# Patient Record
Sex: FEMALE | Race: ASIAN | NOT HISPANIC OR LATINO | Employment: UNEMPLOYED | URBAN - METROPOLITAN AREA
[De-identification: names, ages, dates, MRNs, and addresses within clinical notes are randomized per-mention and may not be internally consistent; named-entity substitution may affect disease eponyms.]

---

## 2019-02-08 ENCOUNTER — COMMUNICATION - HEALTHEAST (OUTPATIENT)
Dept: SCHEDULING | Facility: CLINIC | Age: 34
End: 2019-02-08

## 2019-02-08 ENCOUNTER — OFFICE VISIT - HEALTHEAST (OUTPATIENT)
Dept: FAMILY MEDICINE | Facility: CLINIC | Age: 34
End: 2019-02-08

## 2019-02-08 ENCOUNTER — RECORDS - HEALTHEAST (OUTPATIENT)
Dept: GENERAL RADIOLOGY | Facility: CLINIC | Age: 34
End: 2019-02-08

## 2019-02-08 DIAGNOSIS — R59.0 LYMPHADENOPATHY, AXILLARY: ICD-10-CM

## 2019-02-08 DIAGNOSIS — R07.89 OTHER CHEST PAIN: ICD-10-CM

## 2019-02-08 DIAGNOSIS — R07.89 ATYPICAL CHEST PAIN: ICD-10-CM

## 2019-02-08 LAB
ALBUMIN SERPL-MCNC: 4.1 G/DL (ref 3.5–5)
ALP SERPL-CCNC: 92 U/L (ref 45–120)
ALT SERPL W P-5'-P-CCNC: 16 U/L (ref 0–45)
ANION GAP SERPL CALCULATED.3IONS-SCNC: 10 MMOL/L (ref 5–18)
AST SERPL W P-5'-P-CCNC: 14 U/L (ref 0–40)
ATRIAL RATE - MUSE: 90 BPM
BASOPHILS # BLD AUTO: 0.1 THOU/UL (ref 0–0.2)
BASOPHILS NFR BLD AUTO: 0 % (ref 0–2)
BILIRUB SERPL-MCNC: 0.2 MG/DL (ref 0–1)
BUN SERPL-MCNC: 7 MG/DL (ref 8–22)
CALCIUM SERPL-MCNC: 9.7 MG/DL (ref 8.5–10.5)
CHLORIDE BLD-SCNC: 105 MMOL/L (ref 98–107)
CK SERPL-CCNC: 115 U/L (ref 30–190)
CK-MB: 1 NG/ML (ref 0–7)
CO2 SERPL-SCNC: 23 MMOL/L (ref 22–31)
CREAT SERPL-MCNC: 0.77 MG/DL (ref 0.6–1.1)
D DIMER PPP FEU-MCNC: 0.31 FEU UG/ML
DIASTOLIC BLOOD PRESSURE - MUSE: NORMAL MMHG
EOSINOPHIL # BLD AUTO: 0.1 THOU/UL (ref 0–0.4)
EOSINOPHIL NFR BLD AUTO: 1 % (ref 0–6)
ERYTHROCYTE [DISTWIDTH] IN BLOOD BY AUTOMATED COUNT: 12.4 % (ref 11–14.5)
GFR SERPL CREATININE-BSD FRML MDRD: >60 ML/MIN/1.73M2
GLUCOSE BLD-MCNC: 102 MG/DL (ref 70–125)
HCT VFR BLD AUTO: 39.4 % (ref 35–47)
HGB BLD-MCNC: 12.7 G/DL (ref 12–16)
INTERPRETATION ECG - MUSE: NORMAL
LYMPHOCYTES # BLD AUTO: 2.3 THOU/UL (ref 0.8–4.4)
LYMPHOCYTES NFR BLD AUTO: 20 % (ref 20–40)
MCH RBC QN AUTO: 28.7 PG (ref 27–34)
MCHC RBC AUTO-ENTMCNC: 32.3 G/DL (ref 32–36)
MCV RBC AUTO: 89 FL (ref 80–100)
MONOCYTES # BLD AUTO: 0.5 THOU/UL (ref 0–0.9)
MONOCYTES NFR BLD AUTO: 4 % (ref 2–10)
NEUTROPHILS # BLD AUTO: 8.9 THOU/UL (ref 2–7.7)
NEUTROPHILS NFR BLD AUTO: 75 % (ref 50–70)
P AXIS - MUSE: 56 DEGREES
PLATELET # BLD AUTO: 511 THOU/UL (ref 140–440)
PMV BLD AUTO: 6.7 FL (ref 7–10)
POTASSIUM BLD-SCNC: 4.5 MMOL/L (ref 3.5–5)
PR INTERVAL - MUSE: 130 MS
PROT SERPL-MCNC: 8.5 G/DL (ref 6–8)
QRS DURATION - MUSE: 80 MS
QT - MUSE: 352 MS
QTC - MUSE: 430 MS
R AXIS - MUSE: 11 DEGREES
RBC # BLD AUTO: 4.42 MILL/UL (ref 3.8–5.4)
SODIUM SERPL-SCNC: 138 MMOL/L (ref 136–145)
SYSTOLIC BLOOD PRESSURE - MUSE: NORMAL MMHG
T AXIS - MUSE: 40 DEGREES
TROPONIN I SERPL-MCNC: <0.01 NG/ML (ref 0–0.29)
VENTRICULAR RATE- MUSE: 90 BPM
WBC: 11.8 THOU/UL (ref 4–11)

## 2019-02-12 ENCOUNTER — COMMUNICATION - HEALTHEAST (OUTPATIENT)
Dept: FAMILY MEDICINE | Facility: CLINIC | Age: 34
End: 2019-02-12

## 2019-02-12 ENCOUNTER — OFFICE VISIT - HEALTHEAST (OUTPATIENT)
Dept: FAMILY MEDICINE | Facility: CLINIC | Age: 34
End: 2019-02-12

## 2019-02-12 DIAGNOSIS — T14.8XXA MUSCULOSKELETAL STRAIN: ICD-10-CM

## 2019-03-01 ENCOUNTER — COMMUNICATION - HEALTHEAST (OUTPATIENT)
Dept: TELEHEALTH | Facility: CLINIC | Age: 34
End: 2019-03-01

## 2019-03-01 ENCOUNTER — OFFICE VISIT - HEALTHEAST (OUTPATIENT)
Dept: FAMILY MEDICINE | Facility: CLINIC | Age: 34
End: 2019-03-01

## 2019-03-01 DIAGNOSIS — K21.9 GASTROESOPHAGEAL REFLUX DISEASE WITHOUT ESOPHAGITIS: ICD-10-CM

## 2019-03-01 DIAGNOSIS — F41.1 GAD (GENERALIZED ANXIETY DISORDER): ICD-10-CM

## 2019-03-01 DIAGNOSIS — M94.0 COSTOCHONDRITIS: ICD-10-CM

## 2019-03-01 LAB — TSH SERPL DL<=0.005 MIU/L-ACNC: 2.66 UIU/ML (ref 0.3–5)

## 2019-03-04 LAB — 25(OH)D3 SERPL-MCNC: 19.1 NG/ML (ref 30–80)

## 2019-03-07 ENCOUNTER — COMMUNICATION - HEALTHEAST (OUTPATIENT)
Dept: FAMILY MEDICINE | Facility: CLINIC | Age: 34
End: 2019-03-07

## 2019-07-26 ENCOUNTER — COMMUNICATION - HEALTHEAST (OUTPATIENT)
Dept: SCHEDULING | Facility: CLINIC | Age: 34
End: 2019-07-26

## 2019-07-26 DIAGNOSIS — K21.00 GASTROESOPHAGEAL REFLUX DISEASE WITH ESOPHAGITIS: ICD-10-CM

## 2019-08-07 ENCOUNTER — OFFICE VISIT - HEALTHEAST (OUTPATIENT)
Dept: FAMILY MEDICINE | Facility: CLINIC | Age: 34
End: 2019-08-07

## 2019-08-07 DIAGNOSIS — R53.82 CHRONIC FATIGUE: ICD-10-CM

## 2019-08-07 DIAGNOSIS — K21.9 GASTROESOPHAGEAL REFLUX DISEASE WITHOUT ESOPHAGITIS: ICD-10-CM

## 2019-08-07 DIAGNOSIS — K21.00 GASTROESOPHAGEAL REFLUX DISEASE WITH ESOPHAGITIS: ICD-10-CM

## 2019-08-07 DIAGNOSIS — M94.0 COSTOCHONDRITIS: ICD-10-CM

## 2019-08-07 DIAGNOSIS — E55.9 VITAMIN D DEFICIENCY: ICD-10-CM

## 2019-08-07 LAB — HGB BLD-MCNC: 11.8 G/DL (ref 12–16)

## 2019-08-08 LAB
25(OH)D3 SERPL-MCNC: 14.9 NG/ML (ref 30–80)
25(OH)D3 SERPL-MCNC: 14.9 NG/ML (ref 30–80)

## 2021-05-30 NOTE — TELEPHONE ENCOUNTER
calling. Consent to communicate in chart.    Finished omeprazole 10 days ago.  Was not able to refill it. Didn't try.    Stomach has acidity issue most of the time.    One month has been continuing.    Wants to see pcp to follow up on GERD.    Transferred to scheduling for an appointment.    30 day supply sent to pharmacy per request to bridge to appointment.    Sarah Wagner, RN, [facility] Nurse Triage/Med Refills RN

## 2021-05-31 NOTE — PROGRESS NOTES
"Assessment/plan   Hope Hussein is a 33 y.o. female who is New patient to my practice here with   Chief Complaint   Patient presents with     Follow-up     GERD        Hope was seen today for follow-up.    Diagnoses and all orders for this visit:    Gastroesophageal reflux disease without esophagitis    Costochondritis    Gastroesophageal reflux disease with esophagitis    Vitamin D deficiency  -     Vitamin D, Total (25-Hydroxy)    Chronic fatigue  -     Cancel: Thyroid Stimulating Hormone (TSH)  -     Hemoglobin    To cover the reflux also recommend Prilosec 20 mg first thing in the morning for next 3-4-week  Patient worry that mid chest pain is either her heart or breast cancer patient reassured that neither of that is true by her physical exam today, recommend local treatment mostly pain balm icing, try to avoid any heavy lifting including the twins.  And if needed take ibuprofen or Aleve to help the inflammation.  Patient has very significant finding of costochondritis mostly left sternocostal border   Follow-up labs done on vitamin D and hemoglobin just to make sure if she need higher dose of vitamin D supplement.  Her cycles are pretty regular and not heavy so most likely hemoglobin is normal  How to manage his stress and anxiety also discussed during this visit    she did had extensive workup for her EKG and lab work at the first urgent care visit  Subjective:      HPI: Hope Hussein is a 33 y.o. female     Patient Follow up on GERD and costochondritis , while she was taking her vitamin D and Prilosec she felt pain seems to had improved .  But recently she was doing yoga push-ups and that flared up her pain.  When she take Tylenol it does seems to ease the pain , but comes back by next morning     Continue to have intermittent pains throughout her chest, breasts, and back since then. The pain was \"light and sharp\" and came and went in seconds.  Rates her pain for 6/10 whenever she tried to lift her " twins do any kind of physical activity Pt does not exercise regularly.  She has not taken Tylenol for the past 2 days a which does help bring the pain level down to 1/10   Patient is a vegetarian.       Results for orders placed or performed in visit on 08/07/19   Hemoglobin   Result Value Ref Range    Hemoglobin 11.8 (L) 12.0 - 16.0 g/dL     No results found.      I have personally reviewed the patient's allergies, medications, past medical history, family history, social history, rooming notes and problem list in detail and updated the patient record as necessary.      No past medical history on file.  No past surgical history on file.  Patient has no known allergies.  Current Outpatient Medications   Medication Sig Dispense Refill     omeprazole (PRILOSEC) 20 MG capsule Take 1 capsule (20 mg total) by mouth daily before breakfast. 30 capsule 0     No current facility-administered medications for this visit.      No family history on file.    Patient Active Problem List   Diagnosis     Costochondritis       Review of Systems   12 point comprehensive review of systems was negative except as noted and HPI     Social History     Social History Narrative     Not on file       Objective:     Vitals:    08/07/19 1309   BP: 114/68   Pulse: 96   Weight: 132 lb 1.6 oz (59.9 kg)       Physical Exam:   Physical Exam:  General Appearance:  Appears comfortable, Alert, cooperative, no distress,   Head: Normocephalic, without obvious abnormality, atraumatic  Eyes: PERRL, conjunctiva/corneas clear, EOM's intact, both eyes             Nose: Nares normal, no drainage   Throat: Lips, mucosa, and tongue normal; teeth and gums normal  Neck: Supple, symmetrical, trachea midline, no adenopathy;                      Lungs: Clear to auscultation bilaterally, respirations unlabored  Chest Wall:  Positive pain at sternoclavicular joint more on the left side  Heart: Regular rate and rhythm, S1 and S2 normal, no murmur, rubs or  gallop  Abdomen: Soft, non-tender, bowel sounds active all four quadrants,   no masses, no organomegaly  Extremities: Extremities normal, atraumatic, no cyanosis or edema  Pulses: DP pulses are 1-2+ bilat.    Skin: no rashes or lesions  Neurologic: normal and equal strength bilat in upper and lower extremities        This note has been dictated using voice recognition software. Any grammatical or context distortions are unintentional and inherent to the software  Maggie Coats MD    There are no Patient Instructions on file for this visit.

## 2021-06-02 VITALS — WEIGHT: 132 LBS

## 2021-06-02 VITALS — WEIGHT: 132.5 LBS

## 2021-06-02 VITALS — WEIGHT: 131 LBS

## 2021-06-03 VITALS — WEIGHT: 132.1 LBS

## 2021-06-16 PROBLEM — M94.0 COSTOCHONDRITIS: Status: ACTIVE | Noted: 2019-03-01

## 2021-06-17 NOTE — PATIENT INSTRUCTIONS - HE
Patient Instructions by Maggie Coats MD at 3/1/2019  9:40 AM     Author: Maggie Coats MD Service: -- Author Type: Physician    Filed: 3/1/2019 10:30 AM Encounter Date: 3/1/2019 Status: Addendum    : Maggie Coats MD (Physician)    Related Notes: Original Note by Maggie Coats MD (Physician) filed at 3/1/2019 10:30 AM       Dear Hope,   Please start the omeprazole 20 mg one tab first thing in the morning for 30 days to help acid / heart burn  Also giving you steroid to help the inflammation 1 tab oral twice daily  For 5 days only   Also take pain med as needed   Doing some labs to figure out thyroid hormone and Vit D level   If intrusted we can refer you to psychologist     Maggie Coats MD 3/1/2019 10:28 AM           Chest Wall Pain: Costochondritis    The chest pain that you have had today is caused by costochondritis. This condition is caused by an inflammation of the cartilage joining your ribs to your breastbone. It is not caused by heart or lung problems. Your healthcare team has made sure that the chest pain you feel is not from a life threatening cause of chest pain such as heart attack, collapsed lung, blood clot in the lung, tear in the aorta, or esophageal rupture. The inflammation may have been brought on by a blow to the chest, lifting heavy objects, intense exercise, or an illness that made you cough and sneeze a lot. It often occurs during times of emotional stress. It can be painful, but it is not dangerous. It usually goes away in 1 to 2 weeks. But it may happen again. Rarely, a more serious condition may cause symptoms similar to costochondritis. Thats why its important to watch for the warning signs listed below.  Home care  Follow these guidelines when caring for yourself at home:    If you feel that emotional stress is a cause of your condition, try to figure out the sources of that stress. It may not be obvious. Learn ways to deal with the stress in your life. This can include regular  exercise, muscle relaxation, meditation, or simply taking time out for yourself.    You may use acetaminophen, ibuprofen, or naproxen to control pain, unless another pain medicine was prescribed. If you have liver or kidney disease or ever had a stomach ulcer, talk with your healthcare provider before using these medicines.    You can also help ease pain by using a hot, wet compress or heating pad. Use this with or without a medicated skin cream that helps relieves pain.    Do stretching exercise as advised by your provider.    Take any prescribed medicines as directed.  Follow-up care  Follow up with your healthcare provider, or as advised, if you do not start to get better in the next 2 days.  When to seek medical advice  Call your healthcare provider right away if any of these occur:    A change in the type of pain. Call if it feels different, becomes more serious, lasts longer, or spreads into your shoulder, arm, neck, jaw, or back.    Shortness of breath or pain gets worse when you breathe    Weakness, dizziness, or fainting    Cough with dark-colored sputum (phlegm) or blood    Abdominal pain    Dark red or black stools    Fever of 100.4 F (38 C) or higher, or as directed by your healthcare provider  Date Last Reviewed: 12/1/2016 2000-2017 [employer]. [address] [place] [address], [place] [address]. All rights reserved. This information is not intended as a substitute for professional medical care. Always follow your healthcare professional's instructions.           Patient Education     GERD (Adult)    The esophagus is a tube that carries food from the mouth to the stomach. A valve at the lower end of the esophagus prevents stomach acid from flowing upward. When this valve doesn't work properly, stomach contents may repeatedly flow back up (reflux) into the esophagus. This is called gastroesophageal reflux disease (GERD). GERD can irritate the esophagus. It can cause problems with swallowing or  "breathing. In severe cases, GERD can cause recurrent pneumonia or other serious problems.  Symptoms of reflux include burning, pressure or sharp pain in the upper abdomen or mid to lower chest. The pain can spread to the neck, back, or shoulder. There may be belching, an acid taste in the back of the throat, chronic cough, or sore throat or hoarseness. GERD symptoms often occur during the day after a big meal. They can also occur at night when lying down.   Home care  Lifestyle changes can help reduce symptoms. If needed, medicines may be prescribed. Symptoms often improve with treatment, but if treatment is stopped, the symptoms often return after a few months. So most persons with GERD will need to continue treatment.  Lifestyle changes    Limit or avoid fatty, fried, and spicy foods, as well as coffee, chocolate, mint, and foods with high acid content such as tomatoes and citrus fruit and juices (orange, grapefruit, lemon).    Dont eat large meals, especially at night. Frequent, smaller meals are best. Do not lie down right after eating. And dont eat anything 3 hours before going to bed.    Avoid drinking alcohol and smoking. As much as possible, stay away from second hand smoke.    If you are overweight, losing weight will reduce symptoms.     Avoid wearing tight clothing around your stomach area.    If your symptoms occur during sleep, use a foam wedge to elevate your upper body (not just your head.) Or, place 4\" blocks under the head of your bed.  Medicines  If needed, medicines can help relieve the symptoms of GERD and prevent damage to the esophagus. Discuss a medicine plan with your healthcare provider. This may include one or more of the following medicines:    Antacids to help neutralize the normal acids in your stomach.    Acid blockers (H2 blockers) to decrease acid production.    Acid inhibitors (PPIs) to decrease acid production in a different way than the blockers. They may work better, but can take " a little longer to take effect.  Take an antacid 30-60 minutes after eating and at bedtime, but not at the same time as an acid blocker.  Try not to take medicines such as ibuprofen and aspirin. If you are taking aspirin for your heart or other medical reasons, talk to your healthcare provider about stopping it.  Follow-up care  Follow up with your healthcare provider or as advised by our staff.  When to seek medical advice  Call your healthcare provider if any of the following occur:    Stomach pain gets worse or moves to the lower right abdomen (appendix area)    Chest pain appears or gets worse, or spreads to the back, neck, shoulder, or arm    Frequent vomiting (cant keep down liquids)    Blood in the stool or vomit (red or black in color)    Feeling weak or dizzy    Fever of 100.4 F (38 C) or higher, or as directed by your healthcare provider  Date Last Reviewed: 6/23/2015 2000-2017 [employer]. [address] [place] [address], [place] [address]. All rights reserved. This information is not intended as a substitute for professional medical care. Always follow your healthcare professional's instructions.

## 2021-06-17 NOTE — PATIENT INSTRUCTIONS - HE
Patient Instructions by Maribel Nava CNP at 2/8/2019  3:00 PM     Author: Maribel Nava CNP Service: -- Author Type: Nurse Practitioner    Filed: 2/8/2019  4:48 PM Encounter Date: 2/8/2019 Status: Addendum    : Maribel Nava CNP (Nurse Practitioner)    Related Notes: Original Note by Maribel Nava CNP (Nurse Practitioner) filed at 2/8/2019  4:45 PM         Patient Education   -I am not able to determine was causing you pain and discomfort.  You do have enlarged axillary lymph node.  I recommend you use ibuprofen 600 mg 4 times daily as needed or Tylenol 1000 mg 3 times daily as needed.  -You will make an appointment with a PCP for follow-up for withdrawal of fever CBC in about 1 week.  -If you have symptoms gets worse or has no improvement.  If you have increased pressure on your chest or shortness of breath and need you to go to the emergency room.  -We have ordered additional lab work.  We will call you with the results as soon as we can.  Noncardiac Chest Pain    Based on your visit today, the healthcare provider doesnt know what is causing your chest pain. In most cases, people who come to the emergency department with chest pain dont have a problem with their heart. Instead, the pain is caused by other conditions. It's important for the healthcare team to be sure you are not having a life threatening cause for chest pain such as a heart attack, blood clot in the lungs, collapsed lung, ruptured esophagus, or tearing of the aorta. Once these major causes have been ruled out, you may have further evaluation for non-heart causes of chest pain. These may be problems with the lungs, muscles, bones, digestive tract, nerves, or mental health.  Lung problems    Inflammation around the lungs (pleurisy)    Collapsed lung (pneumothorax)    Fluid around the lungs (pleural effusion)    Lung cancer (a rare cause of chest pain)  Muscle or bone problems    Inflamed cartilage  between the ribs (costochondritis)    Fibromyalgia    Rheumatoid arthritis    Chest wall strain  Digestive system problems    Reflux    Stomach ulcer    Spasms of the esophagus    Gall stones    Gallbladder inflammation  Mental health conditions    Panic or anxiety attacks    Emotional distress  Your condition doesnt seem serious and your pain doesnt appear to be coming from your heart. But sometimes the signs of a serious problem take more time to appear. Watch for the warning signs listed below.  Home care  Follow these guidelines when caring for yourself at home:    Rest today and avoid strenuous activity.    Take any prescribed medicine as directed.  Follow-up care  Follow up with your healthcare provider, or as advised, if you dont start to feel better within 24 hours.  When to seek medical advice  Call your healthcare provider right away if any of these occur:    A change in the type of pain. Call if it feels different, becomes more serious, lasts longer, or begins to spread into your shoulder, arm, neck, jaw, or back.    Shortness of breath    You feel more pain when you breathe    Cough with dark-colored mucus or blood    Weakness, dizziness, or fainting    Fever of 100.4 F (38 C) or higher, or as directed by your healthcare provider    Swelling, pain, or redness in one leg  Date Last Reviewed: 12/1/2016 2000-2017 [employer]. [address] [place] [address], [place] [address]. All rights reserved. This information is not intended as a substitute for professional medical care. Always follow your healthcare professional's instructions.           Patient Education     Lymphadenopathy  Lymphadenopathy is swelling of the lymph nodes. Lymph nodes are small, bean-shaped glands around the body.  What are lymph nodes?  Lymph nodes are part of your immune system. These glands are found in your neck, over your clavicle, armpits, groin, chest, and abdomen. They act as filters for lymph fluid as it flows through  your body. Lymph fluid contains white blood cells (lymphocytes) that help the body fight infection and disease.   Why lymph nodes swell  Lymphadenopathy is very common. The glands often enlarge during a viral or bacterial infection. It can happen during a cold, the flu, or strep throat. The nodes may swell in just one area of the body, such as the neck (localized). Or nodes may swell all over the body (generalized). The neck (cervical) lymph nodes are the most common site of lymphadenopathy.  What causes lymphadenopathy?  Dead cells and fluid build up in the lymph nodes as they help fight infection or disease. This causes them to swell in size. Enlarged lymph nodes are often near the source of infection. This can help to find the cause of an infection. For example, swollen lymph nodes around the jaw may be because of an infection in the teeth or mouth. But lymphadenopathy may also be generalized. This is common in some viral illnesses such as infectious mononucleosis or chickenpox (varicella).  Lymphadenopathy can also be caused by:    Infection of a lymph node or small group of nodes (lymphadenitis)    Cancer    Reactions to medicines such as antibiotics and certain blood pressure, gout, and seizure medicines    Other health conditions, such as HIV infection, lupus, or sarcoidosis  Symptoms of lymphadenopathy  Lymphadenopathy can cause symptoms such as:    Lumps under the jaw, on the sides or back of the neck, in the armpits, in the groin, or in the chest or belly (abdomen)    Pain or tenderness in any of these areas    Redness or warmth in any of these areas  You may also have symptoms from an infection causing the swollen glands. These symptoms may include fever, sore throat, body aches, or cough.  Diagnosing lymphadenopathy  Your healthcare provider will ask about your health history and symptoms. He or she will give you a physical exam and check the areas where lymph nodes are enlarged. Your healthcare provider  will check the size and location of the nodes, and ask how long they have been swollen and if they are painful. Diagnostic tests and referral to specialists may be recommended. They may include:    Blood tests. These are done to check for signs of infection and other problems.    Urine test. This is also done to check for infection and other problems.    Chest X-ray, ultrasound, CT scan, or MRI scans. These tests can show enlarged lymph nodes or other problems.    Lymph node biopsy. If lymph nodes are swollen for 3 to 4 weeks, they may be checked with a biopsy. Small samples of lymph node tissue are taken and checked in a lab for signs of cancer. You may be referred to a specialist in blood disorders and cancer (hematologist and oncologist).  Treatment for lymphadenopathy  The treatment of enlarged lymph nodes depends on the cause. Enlarged lymph nodes are often harmless and go away without any treatment. Treatment is most often done on the cause of the enlarged nodes and may include:    Antibiotic medicine to treat a bacterial infection    Incision and drainage of a lymph node for lymphadenitis    Other medicines or procedures to treat the cause of the enlarged nodes  You may need follow-up exam in 3 to 4 weeks to recheck enlarged nodes.     When to call your healthcare provider  Call your healthcare provider if you have lymph nodes that are still swollen after 3 to 4 weeks, or as directed by your healthcare provider.   Date Last Reviewed: 5/1/2017 2000-2017 [employer]. [address] [place] [address], [place] [address]. All rights reserved. This information is not intended as a substitute for professional medical care. Always follow your healthcare professional's instructions.

## 2021-06-17 NOTE — PATIENT INSTRUCTIONS - HE
Patient Instructions by Maggie Coats MD at 8/7/2019  1:00 PM     Author: Maggie Coats MD Service: -- Author Type: Physician    Filed: 8/7/2019  2:30 PM Encounter Date: 8/7/2019 Status: Signed    : Maggie Coats MD (Physician)         Patient Education     Costochondritis    Costochondritis is inflammation of a rib or the cartilage that connects a rib to your breastbone (sternum). It causes tenderness, and sometimes chest pain may be sharp or aching, or it may feel like pressure. Pain may get worse with deep breathing, movement, or exercise. In some cases, the pain is mistaken for a heart attack. Despite this, the condition is not serious. Read on to learn more about the condition and how it can be treated.  What causes costochondritis?  The cause of costochondritis is not completely clear, but it may happen after a chest injury, chest infection, or coughing episode. Some physical activities can sometimes lead to costochondritis. Large-breasted women may be more likely to have the condition. Often, the reason for the inflammation is unknown.  Diagnosing costochondritis  There is no test for costochondritis. The condition is diagnosed by the symptoms you have. Your healthcare provider will perform a physical exam. He or she will ask you about your symptoms and examine your chest for tenderness. In some cases, tests are done to rule out more serious problems. These tests may include imaging tests such as chest X-ray, CT scan, or an ECG.  Treating costochondritis  If an underlying cause is found, treatment for that will likely relieve the problem. Costochondritis often goes away on its own. The course of the condition varies from person to person. It usually lasts from weeks to months. In some cases, mild symptoms continue for months to years. To ease symptoms:    Take medicine as directed. These relieve pain and swelling. Ibuprofen or other NSAIDs are often recommended. In some cases, you may be given  prescription medicine, such as muscle relaxants.    Avoid activities that put stress on the chest or spine.    Apply a heating pad (set to warm, not too high, heat) to the breastbone several times a day.    Perform stretching exercises as directed.  Call the healthcare provider right away if you have any of the following:    Pain that is not relieved by medicine    Shortness of breath    Lightheadedness, dizziness, or fainting    Feeling of irregular heartbeat or fast pulse  Anyone with chest pain should see a healthcare provider, especially those who are older and may be at risk for heart disease.   Date Last Reviewed: 10/1/2016    7367-3076 [employer]. [address] [place] [address], [place] [address]. All rights reserved. This information is not intended as a substitute for professional medical care. Always follow your healthcare professional's instructions.

## 2021-06-23 NOTE — PROGRESS NOTES
Assessment:     1. Atypical chest pain  CK Total    CK MB    Troponin I    HM1(CBC and Differential)    Comprehensive Metabolic Panel    Electrocardiogram Perform and Read    HM1 (CBC with Diff)    XR Chest 2 Views    D-dimer, Quantitative   2. Lymphadenopathy, axillary     Xr Chest 2 Views    Result Date: 2/8/2019  XR CHEST 2 VIEWS 2/8/2019 4:08 PM INDICATION: Other chest pain COMPARISON: None. FINDINGS: Negative chest. The lungs are clear and there are no pleural effusions. Normal heart size.         Plan:     Differential diagnosis include but not limited to atypical chest pain, bilateral lymphadenopathy, bilateral breast tenderness, pulmonary embolism, DVT, or infection.  Chest x-ray done, negative.  I discussed the results with the patient.  CBC done to rule out infection, patient with mildly elevated leukocytes 11.8, elevated platelets 550.  Discussed the results with the patient.  Cardiac enzyme labs done and d-dimer done, waiting for the final results.  Discussed with the patient on exam today I cannot find exactly was causing her symptoms.  I will have patient make an appointment and follow-up with a PCP within the next 5 days.  Will call patient with results for CK, CK-MB, troponin, and d-dimer.    I discussed red flag symptoms, return precautions to clinic/ER and follow up care with patient/parent.  Expected clinical course, symptomatic cares advised. Questions answered. Patient/parent amenable with plan.     TT: Spent with patient was 45 minutes with greater than 50% spent in face-to-face counseling regarding the above plan.    Subjective:       33 y.o. female presents for evaluation of pain in the sternum area, under her right breast, and in her bilateral axillary area.  Patient reports that she has been experiencing pain for the past 10 days and noticed the ones in the axillary area for the past 2 days.  She denies any chest pain or chest pressure of feeling like she has an elephant sitting on her  chest.  She denies any dizziness, no nausea, vomiting, diarrhea, or shortness of breath.  She had a viral infection a few days about a week ago which resolved without any medications.  Currently for her pain she has not taken any medications.  She admits that the pain comes on at any time even at rest.  Currently patient is healthy, no medical history except for hard of hearing and wears hearing aids.  She has family history of a heart attack, paternal and maternal grandparents back in [place].  She is not sure of any family history of high cholesterol or diabetes.  She has family history of hypertension.  She also has twin babies who about 2 years old, she had IVF done.    The following portions of the patient's history were reviewed and updated as appropriate: allergies, current medications, past family history, past medical history, past social history, past surgical history and problem list.    Review of Systems  A 12 point comprehensive review of systems was negative except as noted.      Objective:      /76   Pulse (!) 104   Temp 98.8  F (37.1  C) (Oral)   Resp 16   Wt 132 lb 8 oz (60.1 kg)   SpO2 100%   General appearance: alert, appears stated age, cooperative, mild distress and anxious  Head: Normocephalic, without obvious abnormality, atraumatic, sinuses nontender to percussion  Eyes: conjunctivae/corneas clear. PERRL, EOM's intact. Fundi benign.  Ears: normal TM's and external ear canals both ears  Nose: Nares normal. Septum midline. Mucosa normal. No drainage or sinus tenderness.  Throat: lips, mucosa, and tongue normal; teeth and gums normal  Lungs: clear to auscultation bilaterally  Breasts: normal appearance, no masses or tenderness, No nipple retraction or dimpling, No nipple discharge or bleeding, axillary adenopathy  Heart: regular rate and rhythm, S1, S2 normal, no murmur, click, rub or gallop  Extremities: extremities normal, atraumatic, no cyanosis or edema  Pulses: 2+ and  symmetric  Skin: Skin color, texture, turgor normal. No rashes or lesions  Lymph nodes: Axillary adenopathy: positive, bilateral  Neurologic: Grossly normal     This note has been dictated using voice recognition software. Any grammatical or context distortions are unintentional and inherent to the software

## 2021-06-23 NOTE — TELEPHONE ENCOUNTER
"Pt calls with  together on phone.  Reports \"sharp pain in the L breast.\"  \"Sharp pain\".  Onset several hours ago.  No external swelling or drainage.  Not painful to wear clothes.  Pain is not apparently in the actual breast, but deeper chest pain.  Pt states \"The pain also goes down under the L arm, also sometimes into the L arm, also sometimes into the L shoulder.\"  \"Sharp\".  \"Little bit of dizziness when moving around.\"    Advised immediate ED eval.   agrees to facilitate.  Intends to take wife to [facility].    Kisha Benz RN BSBA  [facility] RN Triage     Reason for Disposition    Pain also present in shoulder(s) or arm(s) or jaw    Protocols used: CHEST PAIN-A-OH      "

## 2021-06-24 NOTE — TELEPHONE ENCOUNTER
Medication Request  Medication name: Vitamin D  Pharmacy Name and Location: [place] [id number]  Reason for request: fax received from pharmacy with request for this medication. Fax states the patient received a call to start bu the pharmacy has not received an order.   When did you use medication last?:  n/a  Okay to leave a detailed message: yes

## 2021-06-24 NOTE — PROGRESS NOTES
"ASSESSMENT/PLAN:  Musculoskeletal strain  33-year-old female presents today as a follow-up from walk-in clinic for chest pain.  After discussion with the patient I discovered that the pain of her anterior and posterior chest wall developed after she was forcefully trying to hold the door closed to prevent her  and 2 young children from coming in the attempts to isolate them.  The last couple days, her pain has improved and she is no longer requiring over-the-counter analgesics.  Her walk-in clinic visits, chest x-ray, EKG, laboratory results were reviewed with the patient in detail.  Of note, WBC was slightly elevated at 11.8 and platelets were elevated.  I did asked that the patient follow-up with her primary care provider.  We also spoke about self-care, asking for time off other than using physical force to keep her family out of her personal space, and we also spoke about daily exercising for cardiovascular and mental health.  She verbalized understanding and agree with the plan      Patient Instructions   Dr. Miguel, Dr. Coats, Dr. Dawson, Dr Anguiano        SUBJECTIVE:    Hope Hussein is a 33 y.o. female who came in today to follow-up from a walk-in clinic appointment on 2/8/2019 during which she had CP. Patient feels improved today.   Patient was experiencing intermittent pains throughout her chest, breasts, and back for a week. The pain was \"light and sharp\" and came and went in seconds. Before she felt the pain she was \"stretching\" her upper body by holding the door closed against her  and 2 children, who were trying to get through. She was in the same position, resisting the weight of 3 grown people for \"a long time.\" Pt does not exercise regularly. She was given OTC Tylenol after seeing the nurse practitioner, which relieved her pain. The patient had forgotten about the incident with her family and the door when she saw the nurse practitioner because she was panicked about her pain. She has " not taken Tylenol for the past 2 days and her pain is almost completely gone.   Patient is a vegetarian.     Review of Systems (except those mentioned above)  Constitutional: Negative.   HENT: Negative.   Eyes: Negative.   Respiratory: Negative.   Cardiovascular: Negative.   Gastrointestinal: Negative.   Endocrine: Negative.   Genitourinary: Negative.   Musculoskeletal: Negative.   Skin: Negative.   Allergic/Immunologic: Negative.   Neurological: Negative.   Hematological: Negative.   Psychiatric/Behavioral: Negative.     There are no active problems to display for this patient.    No Known Allergies  No current outpatient medications on file.     No current facility-administered medications for this visit.      No past medical history on file.  No past surgical history on file.  Social History     Socioeconomic History     Marital status:      Spouse name: None     Number of children: None     Years of education: None     Highest education level: None   Social Needs     Financial resource strain: None     Food insecurity - worry: None     Food insecurity - inability: None     Transportation needs - medical: None     Transportation needs - non-medical: None   Occupational History     None   Tobacco Use     Smoking status: Never Smoker     Smokeless tobacco: Never Used   Substance and Sexual Activity     Alcohol use: None     Drug use: None     Sexual activity: None   Other Topics Concern     None   Social History Narrative     None     No family history on file.    OBJECTIVE:    Vitals:    02/12/19 1357   BP: 100/70   Patient Site: Left Arm   Patient Position: Sitting   Cuff Size: Adult Regular   Pulse: (!) 104   Weight: 132 lb (59.9 kg)     There is no height or weight on file to calculate BMI.    Constitutional: Patient is oriented to person, place, and time. Patient appears well-developed and well-nourished. No distress.   Head: Normocephalic and atraumatic.   Right Ear: External ear normal.   Left Ear:  External ear normal.   Nose: Nose normal.    Eyes: Conjunctivae and EOM are normal. Right eye exhibits no discharge. Left eye exhibits no discharge. No scleral icterus.   Neurological: Patient is alert and oriented to person, place, and time. Patient has normal reflexes. No cranial nerve deficit. Coordination normal.   Skin: No rash noted. Patient is not diaphoretic. No erythema. No pallor.    Results for orders placed or performed in visit on 02/08/19   CK Total   Result Value Ref Range    CK, Total 115 30 - 190 U/L   CK MB   Result Value Ref Range    CK-MB 1 0 - 7 ng/mL   Troponin I   Result Value Ref Range    Troponin I <0.01 0.00 - 0.29 ng/mL   Comprehensive Metabolic Panel   Result Value Ref Range    Sodium 138 136 - 145 mmol/L    Potassium 4.5 3.5 - 5.0 mmol/L    Chloride 105 98 - 107 mmol/L    CO2 23 22 - 31 mmol/L    Anion Gap, Calculation 10 5 - 18 mmol/L    Glucose 102 70 - 125 mg/dL    BUN 7 (L) 8 - 22 mg/dL    Creatinine 0.77 0.60 - 1.10 mg/dL    GFR MDRD Af Amer >60 >60 mL/min/1.73m2    GFR MDRD Non Af Amer >60 >60 mL/min/1.73m2    Bilirubin, Total 0.2 0.0 - 1.0 mg/dL    Calcium 9.7 8.5 - 10.5 mg/dL    Protein, Total 8.5 (H) 6.0 - 8.0 g/dL    Albumin 4.1 3.5 - 5.0 g/dL    Alkaline Phosphatase 92 45 - 120 U/L    AST 14 0 - 40 U/L    ALT 16 0 - 45 U/L   HM1 (CBC with Diff)   Result Value Ref Range    WBC 11.8 (H) 4.0 - 11.0 thou/uL    RBC 4.42 3.80 - 5.40 mill/uL    Hemoglobin 12.7 12.0 - 16.0 g/dL    Hematocrit 39.4 35.0 - 47.0 %    MCV 89 80 - 100 fL    MCH 28.7 27.0 - 34.0 pg    MCHC 32.3 32.0 - 36.0 g/dL    RDW 12.4 11.0 - 14.5 %    Platelets 511 (H) 140 - 440 thou/uL    MPV 6.7 (L) 7.0 - 10.0 fL    Neutrophils % 75 (H) 50 - 70 %    Lymphocytes % 20 20 - 40 %    Monocytes % 4 2 - 10 %    Eosinophils % 1 0 - 6 %    Basophils % 0 0 - 2 %    Neutrophils Absolute 8.9 (H) 2.0 - 7.7 thou/uL    Lymphocytes Absolute 2.3 0.8 - 4.4 thou/uL    Monocytes Absolute 0.5 0.0 - 0.9 thou/uL    Eosinophils Absolute 0.1  0.0 - 0.4 thou/uL    Basophils Absolute 0.1 0.0 - 0.2 thou/uL   D-dimer, Quantitative   Result Value Ref Range    D-Dimer, Quant 0.31 <=0.50 FEU ug/mL   Electrocardiogram Perform and Read   Result Value Ref Range    SYSTOLIC BLOOD PRESSURE  mmHg    DIASTOLIC BLOOD PRESSURE  mmHg    VENTRICULAR RATE 90 BPM    ATRIAL RATE 90 BPM    P-R INTERVAL 130 ms    QRS DURATION 80 ms    Q-T INTERVAL 352 ms    QTC CALCULATION (BEZET) 430 ms    P Axis 56 degrees    R AXIS 11 degrees    T AXIS 40 degrees    MUSE DIAGNOSIS       Normal sinus rhythm  Normal ECG  No previous ECGs available  Confirmed by ANUM MURILLO MD LOC:[facility] ([id number]) on 2/8/2019 4:02:26 PM       Xr Chest 2 Views    Result Date: 2/8/2019  XR CHEST 2 VIEWS 2/8/2019 4:08 PM INDICATION: Other chest pain COMPARISON: None. FINDINGS: Negative chest. The lungs are clear and there are no pleural effusions. Normal heart size.    ADDITIONAL HISTORY SUMMARIZED (2): Nurse practitioner (Maribel Nava) note reviewed from 2/8/2019.   DECISION TO OBTAIN EXTRA INFORMATION (1): None.   RADIOLOGY TESTS (1): Chest XR reviewed from 2/8/2019 (normal).   LABS (1): Labs reviewed from 2/8/2019 (no muscle loss or blood clots, ).   MEDICINE TESTS (1): ECG reviewed from 2/8/2019 (normal).   INDEPENDENT REVIEW (2 each): None.     Total data points = 5    I spent a total time of 26 minutes additional to the preventive, greater than 50% counseling and coordinating care regarding chest pain follow-up.    By signing my name below, I, Kaylee Lou, attest that this documentation has been prepared under the direction and in the presence of Dr. Maryana Irizarry.  Electronic Signature: Jama Bustamante. 02/12/2019 14:12.    I, Dr. Yonathan Bowles MD , personally performed the services described in this documentation. All medical record entries made by the scribe were at my direction and in my presence. I have reviewed the chart and discharge instructions (if applicable) and agree that  the record reflects my personal performance and is accurate and complete.

## 2021-06-24 NOTE — TELEPHONE ENCOUNTER
FYI - Status Update  Who is Calling: April from [facility]  Update: Caller was questioning if there is going to be an order for vitamin D. Caller was informed per Maggie Coats MD's note below that patient must purchase vitamin D 5,000 IU OTC. Caller stated she will let the patient know.  Okay to leave a detailed message?:  No return call needed       Notes recorded by Lina Beard CMA on 3/4/2019 at 12:07 PM CST  Patient notified of results.  Lina NATHAN CMA [facility]      Notes recorded by Maggie Coats MD on 3/4/2019 at 11:56 AM CST  Dear Hope,    It was a pleasure to see you in the office the other day.    I reviewed Your  recent  lab results showed normal thyroid function, but low vitamin D level  Please take vitamin D 5000 units every day which is available over-the-counter to keep up the level and bring it to a therapeutic level of more than 30  Please feel free to call back for any concerns or questions.  Thanks for choosing our clinic  for your care.    Maggie Coats MD

## 2021-06-24 NOTE — TELEPHONE ENCOUNTER
----- Message from Maribel Nava CNP sent at 2/9/2019  9:35 AM CST -----  Please call patient and let her know all her lab results are normal.

## 2021-06-26 ENCOUNTER — HEALTH MAINTENANCE LETTER (OUTPATIENT)
Age: 36
End: 2021-06-26

## 2021-06-27 NOTE — PROGRESS NOTES
Progress Notes by Maggie Coats MD at 3/1/2019  9:40 AM     Author: Maggie Coats MD Service: -- Author Type: Physician    Filed: 3/1/2019 12:45 PM Encounter Date: 3/1/2019 Status: Signed    : Maggie Coats MD (Physician)       Assessment/plan   Hope Hussein is a 33 y.o. female who is New patient to my practice here with   Chief Complaint   Patient presents with   ? Follow-up     continual pain in the breast bone- back of shoulder and sometimes the breast        Hope was seen today for follow-up.    Diagnoses and all orders for this visit:    Costochondritis  -     Vitamin D, Total (25-Hydroxy)    AUDRA (generalized anxiety disorder)  -     Thyroid Stimulating Hormone (TSH)    Gastroesophageal reflux disease without esophagitis    Other orders  -     predniSONE (DELTASONE) 20 MG tablet; Take 20 mg by mouth 2 (two) times a day for 5 days.  -     omeprazole (PRILOSEC) 20 MG capsule; Take 1 capsule (20 mg total) by mouth daily before breakfast.      Patient moved to [place] with her twin boy and a girl last year since then she feeling quite overwhelmed as she has not much help stay home alone with her 2 kids while  work full-time this winter she feel more alone because unable to go outside much this is the first winter for the family  She feels she going through a lot of aches and pain no matter what but the mid chest wall pain just started after the incident.  Recommend local ice or local pain patches to help with the overall pain also given dose of prednisone 1 tablet twice a day for 5 days  To cover the reflux also recommend Prilosec 20 mg first thing in the morning for next 3-4-week  Patient worry that mid chest pain is either her heart or breast cancer patient reassured that neither of that is true by her physical exam today  She did had extensive workup for her EKG and lab work at the first urgent care visit  Subjective:      HPI: Hope Hussein is a 33 y.o. female     who came in today to  "follow-up from a walk-in clinic appointment on 2/8/2019 and then again seen on 2/12/2019 at our clinic .  Complaining of continued pain since it started  Patient was experiencing intermittent pains throughout her chest, breasts, and back since then. The pain was \"light and sharp\" and came and went in seconds.  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 She was in the same position, resisting the weight of 3 grown people for \"a long time.\" Pt does not exercise regularly. She was given OTC Tylenol after seeing the nurse practitioner, which relieved her pain. The patient had forgotten about the incident with her family and the door when she saw the nurse practitioner because she was panicked about her pain. She has not taken Tylenol for the past 2 days and her pain is almost completely gone.   Patient is a vegetarian.       Results for orders placed or performed in visit on 02/08/19   CK Total   Result Value Ref Range    CK, Total 115 30 - 190 U/L   CK MB   Result Value Ref Range    CK-MB 1 0 - 7 ng/mL   Troponin I   Result Value Ref Range    Troponin I <0.01 0.00 - 0.29 ng/mL   Comprehensive Metabolic Panel   Result Value Ref Range    Sodium 138 136 - 145 mmol/L    Potassium 4.5 3.5 - 5.0 mmol/L    Chloride 105 98 - 107 mmol/L    CO2 23 22 - 31 mmol/L    Anion Gap, Calculation 10 5 - 18 mmol/L    Glucose 102 70 - 125 mg/dL    BUN 7 (L) 8 - 22 mg/dL    Creatinine 0.77 0.60 - 1.10 mg/dL    GFR MDRD Af Amer >60 >60 mL/min/1.73m2    GFR MDRD Non Af Amer >60 >60 mL/min/1.73m2    Bilirubin, Total 0.2 0.0 - 1.0 mg/dL    Calcium 9.7 8.5 - 10.5 mg/dL    Protein, Total 8.5 (H) 6.0 - 8.0 g/dL    Albumin 4.1 3.5 - 5.0 g/dL    Alkaline Phosphatase 92 45 - 120 U/L    AST 14 0 - 40 U/L    ALT 16 0 - 45 U/L   HM1 (CBC with " Diff)   Result Value Ref Range    WBC 11.8 (H) 4.0 - 11.0 thou/uL    RBC 4.42 3.80 - 5.40 mill/uL    Hemoglobin 12.7 12.0 - 16.0 g/dL    Hematocrit 39.4 35.0 - 47.0 %    MCV 89 80 - 100 fL    MCH 28.7 27.0 - 34.0 pg    MCHC 32.3 32.0 - 36.0 g/dL    RDW 12.4 11.0 - 14.5 %    Platelets 511 (H) 140 - 440 thou/uL    MPV 6.7 (L) 7.0 - 10.0 fL    Neutrophils % 75 (H) 50 - 70 %    Lymphocytes % 20 20 - 40 %    Monocytes % 4 2 - 10 %    Eosinophils % 1 0 - 6 %    Basophils % 0 0 - 2 %    Neutrophils Absolute 8.9 (H) 2.0 - 7.7 thou/uL    Lymphocytes Absolute 2.3 0.8 - 4.4 thou/uL    Monocytes Absolute 0.5 0.0 - 0.9 thou/uL    Eosinophils Absolute 0.1 0.0 - 0.4 thou/uL    Basophils Absolute 0.1 0.0 - 0.2 thou/uL   D-dimer, Quantitative   Result Value Ref Range    D-Dimer, Quant 0.31 <=0.50 FEU ug/mL   Electrocardiogram Perform and Read   Result Value Ref Range    SYSTOLIC BLOOD PRESSURE  mmHg    DIASTOLIC BLOOD PRESSURE  mmHg    VENTRICULAR RATE 90 BPM    ATRIAL RATE 90 BPM    P-R INTERVAL 130 ms    QRS DURATION 80 ms    Q-T INTERVAL 352 ms    QTC CALCULATION (BEZET) 430 ms    P Axis 56 degrees    R AXIS 11 degrees    T AXIS 40 degrees    MUSE DIAGNOSIS       Normal sinus rhythm  Normal ECG  No previous ECGs available  Confirmed by LIDIA REILLY, ANUM LOC: ([id number]) on 2/8/2019 4:02:26 PM       Xr Chest 2 Views    Result Date: 2/8/2019  XR CHEST 2 VIEWS 2/8/2019 4:08 PM INDICATION: Other chest pain COMPARISON: None. FINDINGS: Negative chest. The lungs are clear and there are no pleural effusions. Normal heart size.        I have personally reviewed the patient's allergies, medications, past medical history, family history, social history, rooming notes and problem list in detail and updated the patient record as necessary.      No past medical history on file.  No past surgical history on file.  Patient has no known allergies.  Current Outpatient Medications   Medication Sig Dispense Refill   ? omeprazole (PRILOSEC) 20 MG capsule  Take 1 capsule (20 mg total) by mouth daily before breakfast. 90 capsule 1   ? predniSONE (DELTASONE) 20 MG tablet Take 20 mg by mouth 2 (two) times a day for 5 days. 10 tablet 0     No current facility-administered medications for this visit.      No family history on file.    Patient Active Problem List   Diagnosis   ? Costochondritis       Review of Systems   12 point comprehensive review of systems was negative except as noted and HPI     Social History     Social History Narrative   ? Not on file       Objective:     Vitals:    03/01/19 0949   BP: 90/64   Pulse: 95   Temp: 98.5  F (36.9  C)   TempSrc: Oral   Weight: 131 lb (59.4 kg)       Physical Exam:   Physical Exam:  General Appearance:  Appears comfortable, Alert, cooperative, no distress,   Head: Normocephalic, without obvious abnormality, atraumatic  Eyes: PERRL, conjunctiva/corneas clear, EOM's intact, both eyes             Nose: Nares normal, no drainage   Throat: Lips, mucosa, and tongue normal; teeth and gums normal  Neck: Supple, symmetrical, trachea midline, no adenopathy;                      Lungs: Clear to auscultation bilaterally, respirations unlabored  Chest Wall:  Positive pain at sternoclavicular joint more on the left side  Heart: Regular rate and rhythm, S1 and S2 normal, no murmur, rubs or gallop  Abdomen: Soft, non-tender, bowel sounds active all four quadrants,   no masses, no organomegaly  Extremities: Extremities normal, atraumatic, no cyanosis or edema  Pulses: DP pulses are 1-2+ bilat.    Skin: no rashes or lesions  Neurologic: normal and equal strength bilat in upper and lower extremities        This note has been dictated using voice recognition software. Any grammatical or context distortions are unintentional and inherent to the software  Maggie Coats MD    Patient Instructions   Dear Hope,   Please start the omeprazole 20 mg one tab first thing in the morning for 30 days to help acid / heart burn  Also giving you steroid  to help the inflammation 1 tab oral twice daily  For 5 days only   Also take pain med as needed   Doing some labs to figure out thyroid hormone and Vit D level   If intrusted we can refer you to psychologist     Maggie Coats MD 3/1/2019 10:28 AM           Chest Wall Pain: Costochondritis    The chest pain that you have had today is caused by costochondritis. This condition is caused by an inflammation of the cartilage joining your ribs to your breastbone. It is not caused by heart or lung problems. Your healthcare team has made sure that the chest pain you feel is not from a life threatening cause of chest pain such as heart attack, collapsed lung, blood clot in the lung, tear in the aorta, or esophageal rupture. The inflammation may have been brought on by a blow to the chest, lifting heavy objects, intense exercise, or an illness that made you cough and sneeze a lot. It often occurs during times of emotional stress. It can be painful, but it is not dangerous. It usually goes away in 1 to 2 weeks. But it may happen again. Rarely, a more serious condition may cause symptoms similar to costochondritis. Thats why its important to watch for the warning signs listed below.  Home care  Follow these guidelines when caring for yourself at home:    If you feel that emotional stress is a cause of your condition, try to figure out the sources of that stress. It may not be obvious. Learn ways to deal with the stress in your life. This can include regular exercise, muscle relaxation, meditation, or simply taking time out for yourself.    You may use acetaminophen, ibuprofen, or naproxen to control pain, unless another pain medicine was prescribed. If you have liver or kidney disease or ever had a stomach ulcer, talk with your healthcare provider before using these medicines.    You can also help ease pain by using a hot, wet compress or heating pad. Use this with or without a medicated skin cream that helps relieves pain.    Do  stretching exercise as advised by your provider.    Take any prescribed medicines as directed.  Follow-up care  Follow up with your healthcare provider, or as advised, if you do not start to get better in the next 2 days.  When to seek medical advice  Call your healthcare provider right away if any of these occur:    A change in the type of pain. Call if it feels different, becomes more serious, lasts longer, or spreads into your shoulder, arm, neck, jaw, or back.    Shortness of breath or pain gets worse when you breathe    Weakness, dizziness, or fainting    Cough with dark-colored sputum (phlegm) or blood    Abdominal pain    Dark red or black stools    Fever of 100.4 F (38 C) or higher, or as directed by your healthcare provider  Date Last Reviewed: 12/1/2016 2000-2017 [employer]. [address] [place] [address]. All rights reserved. This information is not intended as a substitute for professional medical care. Always follow your healthcare professional's instructions.           Patient Education     GERD (Adult)    The esophagus is a tube that carries food from the mouth to the stomach. A valve at the lower end of the esophagus prevents stomach acid from flowing upward. When this valve doesn't work properly, stomach contents may repeatedly flow back up (reflux) into the esophagus. This is called gastroesophageal reflux disease (GERD). GERD can irritate the esophagus. It can cause problems with swallowing or breathing. In severe cases, GERD can cause recurrent pneumonia or other serious problems.  Symptoms of reflux include burning, pressure or sharp pain in the upper abdomen or mid to lower chest. The pain can spread to the neck, back, or shoulder. There may be belching, an acid taste in the back of the throat, chronic cough, or sore throat or hoarseness. GERD symptoms often occur during the day after a big meal. They can also occur at night when lying down.   Home care  Lifestyle  "changes can help reduce symptoms. If needed, medicines may be prescribed. Symptoms often improve with treatment, but if treatment is stopped, the symptoms often return after a few months. So most persons with GERD will need to continue treatment.  Lifestyle changes    Limit or avoid fatty, fried, and spicy foods, as well as coffee, chocolate, mint, and foods with high acid content such as tomatoes and citrus fruit and juices (orange, grapefruit, lemon).    Dont eat large meals, especially at night. Frequent, smaller meals are best. Do not lie down right after eating. And dont eat anything 3 hours before going to bed.    Avoid drinking alcohol and smoking. As much as possible, stay away from second hand smoke.    If you are overweight, losing weight will reduce symptoms.     Avoid wearing tight clothing around your stomach area.    If your symptoms occur during sleep, use a foam wedge to elevate your upper body (not just your head.) Or, place 4\" blocks under the head of your bed.  Medicines  If needed, medicines can help relieve the symptoms of GERD and prevent damage to the esophagus. Discuss a medicine plan with your healthcare provider. This may include one or more of the following medicines:    Antacids to help neutralize the normal acids in your stomach.    Acid blockers (H2 blockers) to decrease acid production.    Acid inhibitors (PPIs) to decrease acid production in a different way than the blockers. They may work better, but can take a little longer to take effect.  Take an antacid 30-60 minutes after eating and at bedtime, but not at the same time as an acid blocker.  Try not to take medicines such as ibuprofen and aspirin. If you are taking aspirin for your heart or other medical reasons, talk to your healthcare provider about stopping it.  Follow-up care  Follow up with your healthcare provider or as advised by our staff.  When to seek medical advice  Call your healthcare provider if any of the following " occur:    Stomach pain gets worse or moves to the lower right abdomen (appendix area)    Chest pain appears or gets worse, or spreads to the back, neck, shoulder, or arm    Frequent vomiting (cant keep down liquids)    Blood in the stool or vomit (red or black in color)    Feeling weak or dizzy    Fever of 100.4 F (38 C) or higher, or as directed by your healthcare provider  Date Last Reviewed: 6/23/2015 2000-2017 [employer]. [address] [address]. All rights reserved. This information is not intended as a substitute for professional medical care. Always follow your healthcare professional's instructions.

## 2021-10-16 ENCOUNTER — HEALTH MAINTENANCE LETTER (OUTPATIENT)
Age: 36
End: 2021-10-16

## 2021-12-23 ENCOUNTER — OFFICE VISIT (OUTPATIENT)
Dept: FAMILY MEDICINE | Facility: CLINIC | Age: 36
End: 2021-12-23
Payer: COMMERCIAL

## 2021-12-23 VITALS
WEIGHT: 133 LBS | HEIGHT: 62 IN | SYSTOLIC BLOOD PRESSURE: 112 MMHG | HEART RATE: 88 BPM | DIASTOLIC BLOOD PRESSURE: 60 MMHG | BODY MASS INDEX: 24.48 KG/M2

## 2021-12-23 DIAGNOSIS — Z00.01 ENCOUNTER FOR ROUTINE ADULT MEDICAL EXAM WITH ABNORMAL FINDINGS: Primary | ICD-10-CM

## 2021-12-23 DIAGNOSIS — Z13.228 SCREENING FOR METABOLIC DISORDER: ICD-10-CM

## 2021-12-23 DIAGNOSIS — D50.9 IRON DEFICIENCY ANEMIA, UNSPECIFIED IRON DEFICIENCY ANEMIA TYPE: ICD-10-CM

## 2021-12-23 DIAGNOSIS — Z71.1 PHYSICALLY WELL BUT WORRIED: ICD-10-CM

## 2021-12-23 DIAGNOSIS — Z12.4 SCREENING FOR CERVICAL CANCER: ICD-10-CM

## 2021-12-23 LAB
CHOLEST SERPL-MCNC: 195 MG/DL
FASTING STATUS PATIENT QL REPORTED: NO
HBA1C MFR BLD: 5.2 % (ref 0–5.6)
HDLC SERPL-MCNC: 39 MG/DL
HGB BLD-MCNC: 10.6 G/DL (ref 11.7–15.7)
LDLC SERPL CALC-MCNC: 124 MG/DL
TRIGL SERPL-MCNC: 161 MG/DL

## 2021-12-23 PROCEDURE — 83036 HEMOGLOBIN GLYCOSYLATED A1C: CPT | Performed by: FAMILY MEDICINE

## 2021-12-23 PROCEDURE — 96127 BRIEF EMOTIONAL/BEHAV ASSMT: CPT | Performed by: FAMILY MEDICINE

## 2021-12-23 PROCEDURE — 90471 IMMUNIZATION ADMIN: CPT | Performed by: FAMILY MEDICINE

## 2021-12-23 PROCEDURE — 80061 LIPID PANEL: CPT | Performed by: FAMILY MEDICINE

## 2021-12-23 PROCEDURE — 90686 IIV4 VACC NO PRSV 0.5 ML IM: CPT | Performed by: FAMILY MEDICINE

## 2021-12-23 PROCEDURE — 99395 PREV VISIT EST AGE 18-39: CPT | Mod: 25 | Performed by: FAMILY MEDICINE

## 2021-12-23 PROCEDURE — 36415 COLL VENOUS BLD VENIPUNCTURE: CPT | Performed by: FAMILY MEDICINE

## 2021-12-23 PROCEDURE — 85018 HEMOGLOBIN: CPT | Performed by: FAMILY MEDICINE

## 2021-12-23 ASSESSMENT — MIFFLIN-ST. JEOR: SCORE: 1240.5

## 2021-12-23 NOTE — PROGRESS NOTES
SUBJECTIVE:   CC: [id number]  who presents for preventive health visit.       Patient has been advised of split billing requirements and indicates understanding: Yes    Healthy Habits:     Getting at least 3 servings of Calcium per day:  NO    Bi-annual eye exam:  Yes    Dental care twice a year:  NO    Sleep apnea or symptoms of sleep apnea:  None    Diet:  Vegetarian/vegan    Frequency of exercise:  None    Taking medications regularly:  Yes    Medication side effects:  None    PHQ-2 Total Score: 0    Additional concerns today:  No          PROBLEMS TO ADD ON... General worries about different things including acid reflux, worry about doing Pap smear and if Pap smear is abnormal some aches and pain in the neck and mid chest area. 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    No flowsheet data found.     Today's PHQ-2 Score:   PHQ-2 ( 1999 [employer]) 12/23/2021   Q1: Little interest or pleasure in doing things 0   Q2: Feeling down, depressed or hopeless 0   PHQ-2 Score 0   Q1: Little interest or pleasure in doing things Not at all   Q2: Feeling down, depressed or hopeless Not at all   PHQ-2 Score 0       Abuse: Current or Past (Physical, Sexual or Emotional) - No  Do you feel safe in your environment? Yes    Have you ever done Advance Care Planning? (For example, a Health Directive, POLST, or a discussion with a medical provider or your loved ones about your wishes): No, advance care planning information given to patient to review.  Patient plans to discuss their wishes with loved ones or provider.      Social History     Tobacco Use     Smoking status: Never Smoker     Smokeless tobacco: Never Used   Substance Use Topics     Alcohol use: Not on file       If you drink alcohol do you typically have >3 drinks per day or >7 drinks per week? No    ASSESSMENT/PLAN:   Hope was seen today for physical.    Diagnoses and all orders for this visit:    Encounter for routine adult  "medical exam with abnormal findings  -     REVIEW OF HEALTH MAINTENANCE PROTOCOL ORDERS  -     INFLUENZA VACCINE IM > 6 MONTHS VALENT IIV4 (AFLURIA/FLUZONE)  -     TDAP VACCINE (Adacel, Boostrix)  [[id number]]    Screening for cervical cancer  Comments:  decline today , as never had it done will plan next yr   Orders:  -     Pap Screen with HPV - recommended age 30 - 65 years    Screening for metabolic disorder  -     Hemoglobin A1c; Future  -     Lipid Profile; Future  -     Hemoglobin A1c  -     Lipid Profile    Iron deficiency anemia, unspecified iron deficiency anemia type  -     Hemoglobin; Future  -     Hemoglobin    Physically well but worried        Patient has been advised of split billing requirements and indicates understanding: Yes    COUNSELING:  Reviewed preventive health counseling, as reflected in patient instructions       Regular exercise       Healthy diet/nutrition       Vision screening       Hearing screening       Contraception       Family planning       Advance Care Planning    Estimated body mass index is 24.13 kg/m  as calculated from the following:    Height as of this encounter: 1.581 m (5' 2.25\").    Weight as of this encounter: 60.3 kg (133 lb).        She reports that she has never smoked. She has never used smokeless tobacco.      Reviewed orders with patient.  Reviewed health maintenance and updated orders accordingly - Yes  Lab work is in process  Labs reviewed in EPIC  BP Readings from Last 3 Encounters:   12/23/21 112/60    Wt Readings from Last 3 Encounters:   12/23/21 60.3 kg (133 lb)   08/07/19 59.9 kg (132 lb 1.6 oz)   03/01/19 59.4 kg (131 lb)                 Patient Active Problem List   Diagnosis     Costochondritis     Physically well but worried     No past surgical history on file.    Social History     Tobacco Use     Smoking status: Never Smoker     Smokeless tobacco: Never Used   Substance Use Topics     Alcohol use: Not on file     No family history on file.  " "      Current Outpatient Medications   Medication Sig Dispense Refill     omeprazole (PRILOSEC) 20 MG capsule [OMEPRAZOLE (PRILOSEC) 20 MG CAPSULE] Take 1 capsule (20 mg total) by mouth daily before breakfast. 30 capsule 0     No Known Allergies    Recent Labs   Lab Test 12/23/21  1658 03/01/19  1032 02/08/19  1550   A1C 5.2  --   --    ALT  --   --  16   CR  --   --  0.77   GFRESTIMATED  --   --  >60   GFRESTBLACK  --   --  >60   POTASSIUM  --   --  4.5   TSH  --  2.66  --         Breast Cancer Screening:  Any new diagnosis of family breast, ovarian, or bowel cancer? No    Pertinent mammograms are reviewed under the imaging tab.    History of abnormal Pap smear: NO - age 30-65 PAP every 5 years with negative HPV co-testing recommended     Reviewed and updated as needed this visit by clinical staff  Tobacco  Allergies  Meds  Problems            Reviewed and updated as needed this visit by Provider     Problems           No past medical history on file.   No past surgical history on file.    Review of Systems       OBJECTIVE:   /60   Pulse 88   Ht 1.581 m (5' 2.25\")   Wt 60.3 kg (133 lb)   LMP 12/18/2021 (Exact Date)   BMI 24.13 kg/m    Physical Exam  GENERAL: healthy, alert and no distress  EYES: Eyes grossly normal to inspection, PERRL and conjunctivae and sclerae normal  HENT: ear canals and TM's normal, nose and mouth without ulcers or lesions  NECK: no adenopathy, no asymmetry, masses, or scars and thyroid normal to palpation  RESP: lungs clear to auscultation - no rales, rhonchi or wheezes  BREAST: normal without masses, tenderness or nipple discharge and no palpable axillary masses or adenopathy  CV: regular rate and rhythm, normal S1 S2, no S3 or S4, no murmur, click or rub, no peripheral edema and peripheral pulses strong  ABDOMEN: soft, nontender, no hepatosplenomegaly, no masses and bowel sounds normal   (female): deferred as not ready   MS: no gross musculoskeletal defects noted, no " edema  SKIN: no suspicious lesions or rashes  PSYCH: mentation appears normal, affect normal/bright    Diagnostic Test Results:  Labs reviewed in Epic      Counseling Resources:  ATP IV Guidelines  Pooled Cohorts Equation Calculator  Breast Cancer Risk Calculator  BRCA-Related Cancer Risk Assessment: FHS-7 Tool  FRAX Risk Assessment  ICSI Preventive Guidelines  Dietary Guidelines for Americans, 2010  USDA's MyPlate  ASA Prophylaxis  Lung CA Screening    Maggie Coats MD  [facility]

## 2021-12-23 NOTE — PATIENT INSTRUCTIONS
Preventive Health Recommendations  Female Ages 26 - 39  Yearly exam:   See your health care provider every year in order to    Review health changes.     Discuss preventive care.      Review your medicines if you your doctor has prescribed any.    Until age 30: Get a Pap test every three years (more often if you have had an abnormal result).    After age 30: Talk to your doctor about whether you should have a Pap test every 3 years or have a Pap test with HPV screening every 5 years.   You do not need a Pap test if your uterus was removed (hysterectomy) and you have not had cancer.  You should be tested each year for STDs (sexually transmitted diseases), if you're at risk.   Talk to your provider about how often to have your cholesterol checked.  If you are at risk for diabetes, you should have a diabetes test (fasting glucose).  Shots: Get a flu shot each year. Get a tetanus shot every 10 years.   Nutrition:     Eat at least 5 servings of fruits and vegetables each day.    Eat whole-grain bread, whole-wheat pasta and brown rice instead of white grains and rice.    Get adequate Calcium and Vitamin D.     Lifestyle    Exercise at least 150 minutes a week (30 minutes a day, 5 days of the week). This will help you control your weight and prevent disease.    Limit alcohol to one drink per day.    No smoking.     Wear sunscreen to prevent skin cancer.    See your dentist every six months for an exam and cleaning.    Patient Education     Pap  Does this test have other names?  Pap smear, cervical cytology, Papanicolaou test, Pap smear test, vaginal smear technique  What is this test?  This test checks the cells from inside a woman's cervix for any changes that could lead to cancer. The cervix is the lower part of a woman's uterus that opens into the vagina.   This test is named after Sravan Blanca M.D., one of the healthcare providers who developed this technique of testing for cervical cancer.   Why do I need  this test?  You may need this test as a screening test to look for cervical cancer or changes in cervical cells that might eventually lead to cancer. Major medical groups generally advise that women get regular Pap tests every 3 years starting at age 21. Getting a regular Pap test can be life-saving. Cervical cancer is a serious type of cancer in women. It is also one of the most treatable types when found early.   If your test shows abnormal cells, your healthcare provider may be able to find and treat cervical problems right away, or stop cervical cancer before it becomes life-threatening. Pap tests can also diagnose serious infections and pelvic inflammation.    What other tests might I have along with this test?  You will likely have a pelvic exam along with this test. Depending on your age and other factors, your tissue samples may also be tested for human papillomavirus (HPV) infection at the same time your Pap test is done. Infection with some types of HPV puts you at risk for cervical cancer.   If you have an abnormal Pap test result, you may need other tests. These may include:     Colposcopy. Your cervix and vagina are looked at with a microscope called a colposcope, which magnifies any abnormal areas.    Endocervical curettage. Cells are taken from the opening of your cervix with a spoon-shaped tool and looked at under a microscope. This may be done during the colposcopy.    Biopsy. A small tissue sample is taken from your cervix and looked at under a microscope. This may be done during the colposcopy.   What do my test results mean?  Test results may vary depending on your age, gender, health history, the method used for the test, and other things. Your test results may not mean you have a problem. Ask your healthcare provider what your test results mean for you.    Your results will either be normal, unclear, or abnormal. If you get an unclear or abnormal result, this does not mean that you have cancer.  It can often means a minor cervical problem. Your healthcare provider may do another Pap test to confirm the initial results. Or he or she may advise other tests such as colposcopy.   Occasionally a lab test has a false-positive result. This means you do not have a cervical problem even though the test result shows you do.    How is this test done?  This test is often done with a pelvic exam. You will lie on your back with your knees bent and your feet in stirrups, then relax and spread your legs. Your healthcare provider first checks your vagina and reproductive organs for infections and health problems.   Then your provider uses a device called a speculum to open the vagina. The provider examines your cervix and scrapes off a few cells from inside your cervix.   Some women may have slight discomfort or pressure when the speculum is inserted or when the sample of cells is taken.   Does this test pose any risks?  This test poses no known risks.  What might affect my test results?  Using vaginal lubricants, cleansers, contraceptives, or creams may mask your symptoms. Don't use vaginal douches and don't have sex for 2 days before an exam. Using these products or having sex may wash away or disguise abnormal cervical cells.   How do I get ready for this test?  It may seem like a good idea to wash up before having a Pap test, but this can actually erase the signs of a health problem. For accurate test results, don't have sex or use tampons, douches, vaginal creams, deodorant sprays and powders, and contraceptive foams and jellies for 2 days before your exam.   Although you can have the test while you're menstruating, it is better to have the test when you're not. The ideal time to have a Pap test is at least 5 days after the end of your period.     Be sure your healthcare provider knows about all medicines, herbs, vitamins, and supplements you are taking. This includes medicines that don't need a prescription and any  illegal drugs you may use.    [employer] last reviewed this educational content on 8/1/2020 2000-2021 [employer]. All rights reserved. This information is not intended as a substitute for professional medical care. Always follow your healthcare professional's instructions.           Patient Education     Why Have a Pap Test?  Early on, cervical changes don't cause symptoms. Often the only way to know you have cervical changes is to do a Pap test. A Pap test can find these problems early, when they are easier to treat. Pap tests can also find some infections of the cervix and vagina.   What is a Pap test?  A Pap test is a procedure that helps find changes in the cervix that may lead to cancer. The cervix is the part of the uterus that opens into the vagina. For this test, a small sample of cells is taken from the cervix. This is done in your healthcare provider s office. The cells are then analyzed in a lab. A Pap test is a safe procedure. It takes just a few minutes and causes little or no discomfort.   The HPV connection  Human papillomavirus (HPV) is a family of viruses that spread through skin contact. Certain types are almost always spread through sexual contact. Some HPV types cause genital warts (condyloma). But not all types of HPV cause symptoms you can see. Certain types cause cell changes (dysplasia) in the cervix that can lead to cancer. In fact, HPV infection is the most important risk factor for cervical cancer. Healthcare providers can now test for HPV. Testing for HPV is often done with the Pap test. That s why it s important to have Pap tests as advised by your healthcare provider. This helps ensure that any abnormal cells will be found and treated before they become cancer.   Who should have a Pap test and HPV test?  Ask your healthcare provider when to start having Pap tests, if you should have an HPV test done at the same time, and how often to have them. Follow these guidelines  from the American Cancer Society for cervical cancer screening:     A first Pap test at age 21, and then every 3 years until age 29. HPV testing is not advised during this time. But it may be done to follow up on an abnormal Pap test.    Starting at age 30, the preferred testing is a Pap test done with an HPV test every 5 years. This should be done until age 65. Another option for women in this 30 to 65 age group is to have just the Pap test done every 3 years.    You may need a different screening schedule if you are at high risk for cervical cancer. Risk factors include having HIV, a weak immune system, or exposure to the medicine VARSHA while your mother was pregnant with you. Talk with your provider about the best schedule for you.    If you re over 65 and have had regular screenings for the last 10 years with no abnormal results in the last 20 years, you may stop cervical cancer screening.    If you had a hysterectomy that included removing your cervix, you can stop screening unless the hysterectomy was done to treat cervical cancer or precancer. If you still have your cervix after the hysterectomy, you should keep screening according to the above guidelines.    Routine testing doesn't need to be done each year. But if your test is abnormal, your provider will let you know how often to be tested.     Women who have been vaccinated for HPV should still follow these guidelines.    If you have had cervical cancer, talk with your provider about the screening plan that's best for you.  Lina last reviewed this educational content on 6/1/2020 2000-2021 [employer]. All rights reserved. This information is not intended as a substitute for professional medical care. Always follow your healthcare professional's instructions.           Patient Education     Pap Test  Schedule your test for a time when you will not be having your menstrual period. If you re menstruating at the time of your appointment,  call your healthcare provider to ask if you should reschedule.   For 48 hours before the test    Don't douche.    Don' use vaginal medicines, creams, or spermicides.    For 24 hours before the test    Don't have sex.    How the test is done  1. You lie on an exam table with your feet in stirrups (foot rests). This is the usual position for a pelvic exam (an exam of the reproductive organs).  2. Your healthcare provider uses a speculum (a metal or plastic instrument) to gently open the vagina.  3. Cells are taken from the cervix with a small spatula or rubber broom. A small brush may then be used to remove cells from inside the cervical canal. You may feel pressure or slight discomfort.     A speculum is used to open the vagina so cells can be taken from the cervix.     Preserving the sample  There are 2 ways to preserve the sample after it is taken:    Traditional preservation. With this method, the sample is smeared directly onto a glass microscope slide. The sample is then sent to a lab to be analyzed.    Liquid-based preservation. The sample is placed in a special preservative solution. At the lab, cervical cells are  from blood and mucous cells and spread onto a slide. Screening for human papillomavirus (HPV) can also be done using the same sample.  After the test  You re free to go! There is a slight chance of light bleeding or spotting. Your healthcare provider will tell you when to expect your test results.   Getting your results  Ask your healthcare provider how you will receive your results. You should always obtain and understand results of any testing you have done. These may be obtained by phone, mail, or through online access if available:     Normal result. The cells in the sample appear healthy. Have your next Pap test as recommended by your healthcare provider.    Abnormal result. The lab saw something unusual in your sample. Talk with your healthcare provider about what the results mean.  You may need to repeat the Pap test or have other tests to evaluate the problem.   Lina last reviewed this educational content on 1/1/2020 2000-2021 [employer]. All rights reserved. This information is not intended as a substitute for professional medical care. Always follow your healthcare professional's instructions.

## 2022-10-01 ENCOUNTER — HEALTH MAINTENANCE LETTER (OUTPATIENT)
Age: 37
End: 2022-10-01

## 2022-11-07 ENCOUNTER — LAB (OUTPATIENT)
Dept: LAB | Facility: CLINIC | Age: 37
End: 2022-11-07
Payer: COMMERCIAL

## 2022-11-07 DIAGNOSIS — Z13.228 SCREENING FOR METABOLIC DISORDER: ICD-10-CM

## 2022-11-07 DIAGNOSIS — D50.9 IRON DEFICIENCY ANEMIA, UNSPECIFIED IRON DEFICIENCY ANEMIA TYPE: ICD-10-CM

## 2022-11-07 LAB
CHOLEST SERPL-MCNC: 195 MG/DL
HBA1C MFR BLD: 5.2 % (ref 0–5.6)
HDLC SERPL-MCNC: 47 MG/DL
HGB BLD-MCNC: 11.5 G/DL (ref 11.7–15.7)
LDLC SERPL CALC-MCNC: 130 MG/DL
NONHDLC SERPL-MCNC: 148 MG/DL
TRIGL SERPL-MCNC: 88 MG/DL

## 2022-11-07 PROCEDURE — 80061 LIPID PANEL: CPT

## 2022-11-07 PROCEDURE — 36415 COLL VENOUS BLD VENIPUNCTURE: CPT

## 2022-11-07 PROCEDURE — 85018 HEMOGLOBIN: CPT

## 2022-11-07 PROCEDURE — 83036 HEMOGLOBIN GLYCOSYLATED A1C: CPT

## 2022-11-10 ENCOUNTER — OFFICE VISIT (OUTPATIENT)
Dept: FAMILY MEDICINE | Facility: CLINIC | Age: 37
End: 2022-11-10
Payer: COMMERCIAL

## 2022-11-10 VITALS
OXYGEN SATURATION: 100 % | BODY MASS INDEX: 24.11 KG/M2 | DIASTOLIC BLOOD PRESSURE: 66 MMHG | WEIGHT: 131 LBS | SYSTOLIC BLOOD PRESSURE: 108 MMHG | HEIGHT: 62 IN | HEART RATE: 92 BPM

## 2022-11-10 DIAGNOSIS — Z00.01 ENCOUNTER FOR ROUTINE ADULT MEDICAL EXAM WITH ABNORMAL FINDINGS: Primary | ICD-10-CM

## 2022-11-10 DIAGNOSIS — Z12.4 SCREENING FOR CERVICAL CANCER: ICD-10-CM

## 2022-11-10 DIAGNOSIS — Z13.228 SCREENING FOR METABOLIC DISORDER: ICD-10-CM

## 2022-11-10 PROCEDURE — 0124A COVID-19,PF,PFIZER BOOSTER BIVALENT: CPT | Performed by: FAMILY MEDICINE

## 2022-11-10 PROCEDURE — 90471 IMMUNIZATION ADMIN: CPT | Performed by: FAMILY MEDICINE

## 2022-11-10 PROCEDURE — 99395 PREV VISIT EST AGE 18-39: CPT | Mod: 25 | Performed by: FAMILY MEDICINE

## 2022-11-10 PROCEDURE — 91312 COVID-19,PF,PFIZER BOOSTER BIVALENT: CPT | Performed by: FAMILY MEDICINE

## 2022-11-10 PROCEDURE — 90686 IIV4 VACC NO PRSV 0.5 ML IM: CPT | Performed by: FAMILY MEDICINE

## 2022-11-10 ASSESSMENT — ENCOUNTER SYMPTOMS
JOINT SWELLING: 0
HEMATURIA: 0
PARESTHESIAS: 0
WEAKNESS: 0
ARTHRALGIAS: 0
MYALGIAS: 0
COUGH: 0
NAUSEA: 0
HEADACHES: 0
CONSTIPATION: 0
DYSURIA: 0
PALPITATIONS: 0
EYE PAIN: 0
SHORTNESS OF BREATH: 0
HEMATOCHEZIA: 0
SORE THROAT: 0
FREQUENCY: 0
DIZZINESS: 0
NERVOUS/ANXIOUS: 0
HEARTBURN: 0
FEVER: 0
CHILLS: 0
ABDOMINAL PAIN: 0
DIARRHEA: 0

## 2022-11-10 NOTE — PROGRESS NOTES
SUBJECTIVE:   CC: Hope is an 37 year old who presents for preventive health visit.     Patient has been advised of split billing requirements and indicates understanding: Yes  Healthy Habits:     Getting at least 3 servings of Calcium per day:  Yes    Bi-annual eye exam:  Yes    Dental care twice a year:  Yes    Sleep apnea or symptoms of sleep apnea:  None    Diet:  Vegetarian/vegan    Frequency of exercise:  1 day/week    Duration of exercise:  Less than 15 minutes    Taking medications regularly:  Yes    Medication side effects:  None    PHQ-2 Total Score: 0    Additional concerns today:  No        Wt Readings from Last 3 Encounters:   11/10/22 59.4 kg (131 lb)   12/23/21 60.3 kg (133 lb)   08/07/19 59.9 kg (132 lb 1.6 oz)         Today's PHQ-2 Score:   PHQ-2 ( 1999 Pfizer) 11/10/2022   Q1: Little interest or pleasure in doing things 0   Q2: Feeling down, depressed or hopeless 0   PHQ-2 Score 0   Q1: Little interest or pleasure in doing things Not at all   Q2: Feeling down, depressed or hopeless Not at all   PHQ-2 Score 0       Abuse: Current or Past (Physical, Sexual or Emotional) - No  Do you feel safe in your environment? Yes        Social History     Tobacco Use     Smoking status: Never     Smokeless tobacco: Never   Substance Use Topics     Alcohol use: Not on file     If you drink alcohol do you typically have >3 drinks per day or >7 drinks per week? No    Alcohol Use 11/10/2022   Prescreen: >3 drinks/day or >7 drinks/week? No   Prescreen: >3 drinks/day or >7 drinks/week? -       Reviewed orders with patient.  Reviewed health maintenance and updated orders accordingly - Yes      Breast Cancer Screening:    Breast CA Risk Assessment (FHS-7) 12/23/2021   Do you have a family history of breast, colon, or ovarian cancer? No / Unknown       click delete button to remove this line now    Pertinent mammograms are reviewed under the imaging tab.    History of abnormal Pap smear: NO - age 30-65 PAP every 5  years with negative HPV co-testing recommended not ready to do pap today     Reviewed and updated as needed this visit by clinical staff   Tobacco  Allergies  Meds              Reviewed and updated as needed this visit by Provider                 History reviewed. No pertinent past medical history.   History reviewed. No pertinent surgical history.    Review of Systems  CONSTITUTIONAL: NEGATIVE for fever, chills, change in weight  INTEGUMENTARU/SKIN: NEGATIVE for worrisome rashes, moles or lesions  EYES: NEGATIVE for vision changes or irritation  ENT: NEGATIVE for ear, mouth and throat problems  RESP: NEGATIVE for significant cough or SOB  BREAST: NEGATIVE for masses, tenderness or discharge  CV: NEGATIVE for chest pain, palpitations or peripheral edema  GI: NEGATIVE for nausea, abdominal pain, heartburn, or change in bowel habits  : NEGATIVE for unusual urinary or vaginal symptoms. Periods are regular.  MUSCULOSKELETAL: NEGATIVE for significant arthralgias or myalgia  NEURO: NEGATIVE for weakness, dizziness or paresthesias  PSYCHIATRIC: NEGATIVE for changes in mood or affect     OBJECTIVE:   There were no vitals taken for this visit.  Physical Exam  GENERAL: healthy, alert and no distress  NECK: no adenopathy, no asymmetry, masses, or scars and thyroid normal to palpation  RESP: lungs clear to auscultation - no rales, rhonchi or wheezes  CV: regular rate and rhythm, normal S1 S2, no S3 or S4, no murmur, click or rub, no peripheral edema and peripheral pulses strong  ABDOMEN: soft, nontender, no hepatosplenomegaly, no masses and bowel sounds normal  MS: no gross musculoskeletal defects noted, no edema    Diagnostic Test Results:  Labs reviewed in Epic    ASSESSMENT/PLAN:   Hope was seen today for physical.    Diagnoses and all orders for this visit:    Encounter for routine adult medical exam with abnormal findings    Screening for cervical cancer  -     Pap Screen with HPV - recommended age 30 - 65 years;  "Future    Screening for metabolic disorder  -     Cancel: Lipid Profile; Future  -     Cancel: Hemoglobin; Future    Other orders  -     TDAP VACCINE (Adacel, Boostrix)  -     INFLUENZA VACCINE IM > 6 MONTHS VALENT IIV4 (AFLURIA/FLUZONE); Future  -     COVID-19,PF,PFIZER BOOSTER BIVALENT (12+YRS); Future  -     COVID-19,PF,PFIZER BOOSTER BIVALENT (12+YRS)  -     INFLUENZA VACCINE IM > 6 MONTHS VALENT IIV4 (AFLURIA/FLUZONE)        Patient has been advised of split billing requirements and indicates understanding: Yes      COUNSELING:  Reviewed preventive health counseling, as reflected in patient instructions       Regular exercise       Healthy diet/nutrition       Vision screening       Hearing screening       Colorectal Cancer Screening       Consider Hep C screening for all patients one time for ages 18-79 years       Advance Care Planning    Estimated body mass index is 24.13 kg/m  as calculated from the following:    Height as of 12/23/21: 1.581 m (5' 2.25\").    Weight as of 12/23/21: 60.3 kg (133 lb).    Weight management plan: Discussed healthy diet and exercise guidelines    She reports that she has never smoked. She has never used smokeless tobacco.      Counseling Resources:  ATP IV Guidelines  Pooled Cohorts Equation Calculator  Breast Cancer Risk Calculator  BRCA-Related Cancer Risk Assessment: FHS-7 Tool  FRAX Risk Assessment  ICSI Preventive Guidelines  Dietary Guidelines for Americans, 2010  USDA's MyPlate  ASA Prophylaxis  Lung CA Screening    Maggie Coats MD  [facility]  "

## 2023-09-26 ENCOUNTER — OFFICE VISIT (OUTPATIENT)
Dept: FAMILY MEDICINE | Facility: CLINIC | Age: 38
End: 2023-09-26
Payer: COMMERCIAL

## 2023-09-26 VITALS
TEMPERATURE: 98.4 F | DIASTOLIC BLOOD PRESSURE: 70 MMHG | HEIGHT: 62 IN | SYSTOLIC BLOOD PRESSURE: 100 MMHG | HEART RATE: 86 BPM | OXYGEN SATURATION: 99 % | BODY MASS INDEX: 23.34 KG/M2 | WEIGHT: 126.8 LBS | RESPIRATION RATE: 12 BRPM

## 2023-09-26 DIAGNOSIS — K21.00 GASTROESOPHAGEAL REFLUX DISEASE WITH ESOPHAGITIS WITHOUT HEMORRHAGE: ICD-10-CM

## 2023-09-26 DIAGNOSIS — R35.0 URINE FREQUENCY: ICD-10-CM

## 2023-09-26 DIAGNOSIS — Z00.01 ENCOUNTER FOR ROUTINE ADULT MEDICAL EXAM WITH ABNORMAL FINDINGS: Primary | ICD-10-CM

## 2023-09-26 DIAGNOSIS — E55.9 VITAMIN D DEFICIENCY: ICD-10-CM

## 2023-09-26 DIAGNOSIS — G89.29 OTHER CHRONIC PAIN: ICD-10-CM

## 2023-09-26 DIAGNOSIS — Z12.4 SCREENING FOR CERVICAL CANCER: ICD-10-CM

## 2023-09-26 DIAGNOSIS — B37.31 YEAST INFECTION OF THE VAGINA: ICD-10-CM

## 2023-09-26 DIAGNOSIS — Z13.228 SCREENING FOR METABOLIC DISORDER: ICD-10-CM

## 2023-09-26 DIAGNOSIS — M62.89 PELVIC FLOOR DYSFUNCTION: ICD-10-CM

## 2023-09-26 LAB
ALBUMIN UR-MCNC: NEGATIVE MG/DL
APPEARANCE UR: CLEAR
BILIRUB UR QL STRIP: NEGATIVE
CHOLEST SERPL-MCNC: 219 MG/DL
CLUE CELLS: ABNORMAL
COLOR UR AUTO: YELLOW
DEPRECATED CALCIDIOL+CALCIFEROL SERPL-MC: 19 UG/L (ref 20–75)
GLUCOSE UR STRIP-MCNC: NEGATIVE MG/DL
HBA1C MFR BLD: 5.4 % (ref 0–5.6)
HDLC SERPL-MCNC: 46 MG/DL
HGB UR QL STRIP: ABNORMAL
KETONES UR STRIP-MCNC: NEGATIVE MG/DL
LDLC SERPL CALC-MCNC: 144 MG/DL
LEUKOCYTE ESTERASE UR QL STRIP: NEGATIVE
NITRATE UR QL: NEGATIVE
NONHDLC SERPL-MCNC: 173 MG/DL
PH UR STRIP: 5.5 [PH] (ref 5–8)
RBC #/AREA URNS AUTO: ABNORMAL /HPF
SP GR UR STRIP: 1.02 (ref 1–1.03)
SQUAMOUS #/AREA URNS AUTO: ABNORMAL /LPF
TRICHOMONAS, WET PREP: ABNORMAL
TRIGL SERPL-MCNC: 143 MG/DL
TSH SERPL DL<=0.005 MIU/L-ACNC: 2.75 UIU/ML (ref 0.3–4.2)
UROBILINOGEN UR STRIP-ACNC: 0.2 E.U./DL
WBC #/AREA URNS AUTO: ABNORMAL /HPF
WBC'S/HIGH POWER FIELD, WET PREP: ABNORMAL
YEAST, WET PREP: PRESENT

## 2023-09-26 PROCEDURE — 84443 ASSAY THYROID STIM HORMONE: CPT | Performed by: FAMILY MEDICINE

## 2023-09-26 PROCEDURE — 99214 OFFICE O/P EST MOD 30 MIN: CPT | Mod: 25 | Performed by: FAMILY MEDICINE

## 2023-09-26 PROCEDURE — 80061 LIPID PANEL: CPT | Performed by: FAMILY MEDICINE

## 2023-09-26 PROCEDURE — 87624 HPV HI-RISK TYP POOLED RSLT: CPT | Performed by: FAMILY MEDICINE

## 2023-09-26 PROCEDURE — 87210 SMEAR WET MOUNT SALINE/INK: CPT | Performed by: FAMILY MEDICINE

## 2023-09-26 PROCEDURE — G0145 SCR C/V CYTO,THINLAYER,RESCR: HCPCS | Performed by: FAMILY MEDICINE

## 2023-09-26 PROCEDURE — 86038 ANTINUCLEAR ANTIBODIES: CPT | Performed by: FAMILY MEDICINE

## 2023-09-26 PROCEDURE — 86003 ALLG SPEC IGE CRUDE XTRC EA: CPT | Performed by: FAMILY MEDICINE

## 2023-09-26 PROCEDURE — 36415 COLL VENOUS BLD VENIPUNCTURE: CPT | Performed by: FAMILY MEDICINE

## 2023-09-26 PROCEDURE — 81001 URINALYSIS AUTO W/SCOPE: CPT | Performed by: FAMILY MEDICINE

## 2023-09-26 PROCEDURE — 82306 VITAMIN D 25 HYDROXY: CPT | Performed by: FAMILY MEDICINE

## 2023-09-26 PROCEDURE — 83036 HEMOGLOBIN GLYCOSYLATED A1C: CPT | Performed by: FAMILY MEDICINE

## 2023-09-26 PROCEDURE — 99396 PREV VISIT EST AGE 40-64: CPT | Performed by: FAMILY MEDICINE

## 2023-09-26 RX ORDER — FLUCONAZOLE 150 MG/1
150 TABLET ORAL ONCE
Qty: 1 TABLET | Refills: 0 | Status: SHIPPED | OUTPATIENT
Start: 2023-09-26 | End: 2023-09-26

## 2023-09-26 ASSESSMENT — ENCOUNTER SYMPTOMS
HEARTBURN: 1
BREAST MASS: 0
ABDOMINAL PAIN: 1

## 2023-09-26 NOTE — PROGRESS NOTES
SUBJECTIVE:   CC: Hope Hussein 38 year old   who presents for preventive health visit.       Patient has been advised of split billing requirements and indicates understanding: Yes    I spent 25 minutes with the patient total  from the the prevent visit, >50% of which was in counseling regarding the patient's medical issues as noted above.      Healthy Habits:     Getting at least 3 servings of Calcium per day:  Yes    Bi-annual eye exam:  Yes    Dental care twice a year:  NO    Sleep apnea or symptoms of sleep apnea:  None    Diet:  Vegetarian/vegan    Frequency of exercise:  None    Taking medications regularly:  Yes    Medication side effects:  Not applicable    Additional concerns today:  No    Wt Readings from Last 3 Encounters:   09/26/23 57.5 kg (126 lb 12.8 oz)   11/10/22 59.4 kg (131 lb)   12/23/21 60.3 kg (133 lb)            PROBLEMS TO ADD ON...  Concern -pelvic discomfort, hip pain, vaginal discharge and burning and frequent urination  Onset: Off-and-on for few years  Description:   Denies any concern with physical activity or sexual activity but whenever she feel discomfort she feels she might have some form of cancer.  For example vaginal discomfort and urine frequency she felt she might have a vaginal cancer.  When she started worrying about her physical health then she worry about who cannot take care of her kids  Intensity: moderate  Progression of Symptoms:  waxing and waning  Accompanying Signs & Symptoms:  Acid reflux.  Denies any chronic anxiety but just by history patient does struggle with generalized anxiety disorder never being treated or did any therapy for it and not ready to do that yet  Previous history of similar problem:   Off-and-on struggle with similar symptoms for last 10 years and had multiple evaluation done in [place]  Precipitating factors:   Worsened by: Stress anxiety.  And eating any kind of food makes her acid symptoms worse  Alleviating factors:  Improved by:  Not sure    Therapies Tried and outcome: Prilosec which helped in the past for acid reflux      ASSESSMENT/PLAN:   Hope was seen today for physical.    Diagnoses and all orders for this visit:    Encounter for routine adult medical exam with abnormal findings    Screening for metabolic disorder  -     Hemoglobin A1c; Future  -     Lipid Profile; Future  -     Hemoglobin A1c  -     Lipid Profile    Pelvic floor dysfunction  Will offer physical therapy if continue to have pelvic discomfort as patient does not have any discomfort during sexual activity most likely pain might be coming from her hip back.  Urine frequency  -     UA Macroscopic with reflex to Microscopic and Culture - Clinic Collect  -     UA Microscopic with Reflex to Culture    Vitamin D deficiency  -     Vitamin D Deficiency; Future  -     Vitamin D Deficiency    Screening for cervical cancer  -     Pap Screen with HPV - recommended age 30 - 65 years    Gastroesophageal reflux disease with esophagitis without hemorrhage  Recom to keep food diary, restart prilosec as needed   -     Allergy adult food panel; Future  -     Allergy adult food panel    Other chronic pain  complains of migratory aches and pain , for now adv to start her vitamin D supplement   -     Anti Nuclear [place] IgG by IFA with Reflex; Future  -     TSH with free T4 reflex; Future  -     Anti Nuclear T[place]ka IgG by IFA with Reflex  -     TSH with free T4 reflex    Yeast infection of the vagina  -The symptoms of her vaginal irritation and discharge could be from yeast infection which came back positive on wet prep today treat with Diflucan advised to call us back if persistent symptoms         fluconazole (DIFLUCAN) 150 MG tablet; Take 1 tablet (150 mg) by mouth once for 1 dose    Other orders  -     REVIEW OF HEALTH MAINTENANCE PROTOCOL ORDERS  -     Cancel: INFLUENZA VACCINE IM > 6 MONTHS VALENT IIV4 (AFLURIA/FLUZONE); Future  -     TD,PF 7+(TENIVAC); Future        Patient has been  "advised of split billing requirements and indicates understanding: Yes         No data to display                 Today's PHQ-2 Score:       9/26/2023     9:39 AM   PHQ-2 ( 1999 [employer])   Q1: Little interest or pleasure in doing things 0   Q2: Feeling down, depressed or hopeless 0   PHQ-2 Score 0   Q1: Little interest or pleasure in doing things Not at all   Q2: Feeling down, depressed or hopeless Not at all   PHQ-2 Score 0       Abuse: Current or Past (Physical, Sexual or Emotional) - No  Do you feel safe in your environment? Yes    Have you ever done Advance Care Planning? (For example, a Health Directive, POLST, or a discussion with a medical provider or your loved ones about your wishes): No, advance care planning information given to patient to review.  Patient declined advance care planning discussion at this time.    Social History     Tobacco Use    Smoking status: Never    Smokeless tobacco: Never   Substance Use Topics    Alcohol use: Not on file         COUNSELING:  Reviewed preventive health counseling, as reflected in patient instructions       Regular exercise       Healthy diet/nutrition       Vision screening       Hearing screening       Family planning       Advance Care Planning    Estimated body mass index is 23.57 kg/m  as calculated from the following:    Height as of this encounter: 1.562 m (5' 1.5\").    Weight as of this encounter: 57.5 kg (126 lb 12.8 oz).        She reports that she has never smoked. She has never used smokeless tobacco.      Reviewed orders with patient.  Reviewed health maintenance and updated orders accordingly - Yes  Lab work is in process  Labs reviewed in EPIC  BP Readings from Last 3 Encounters:   09/26/23 100/70   11/10/22 108/66   12/23/21 112/60    Wt Readings from Last 3 Encounters:   09/26/23 57.5 kg (126 lb 12.8 oz)   11/10/22 59.4 kg (131 lb)   12/23/21 60.3 kg (133 lb)                 Patient Active Problem List   Diagnosis    Costochondritis    Physically " well but worried     History reviewed. No pertinent surgical history.    Social History     Tobacco Use    Smoking status: Never    Smokeless tobacco: Never   Substance Use Topics    Alcohol use: Not on file     History reviewed. No pertinent family history.        Current Outpatient Medications   Medication Sig Dispense Refill    fluconazole (DIFLUCAN) 150 MG tablet Take 1 tablet (150 mg) by mouth once for 1 dose 1 tablet 0    omeprazole (PRILOSEC) 20 MG DR capsule Take 1 capsule (20 mg) by mouth daily before breakfast 90 capsule 4     No Known Allergies    Recent Labs   Lab Test 09/26/23  1033 11/07/22  1018 12/23/21  1658 03/01/19  1032 02/08/19  1550   A1C 5.4 5.2 5.2  --   --    LDL  --  130* 124  --   --    HDL  --  47* 39*  --   --    TRIG  --  88 161*  --   --    ALT  --   --   --   --  16   CR  --   --   --   --  0.77   GFRESTIMATED  --   --   --   --  >60   GFRESTBLACK  --   --   --   --  >60   POTASSIUM  --   --   --   --  4.5   TSH  --   --   --  2.66  --         Breast Cancer Screening:  Any new diagnosis of family breast, ovarian, or bowel cancer? No    Pertinent mammograms are reviewed under the imaging tab.    History of abnormal Pap smear: NO - age 30-65 PAP every 5 years with negative HPV co-testing recommended     Reviewed and updated as needed this visit by clinical staff   Tobacco  Allergies  Meds  Problems  Med Hx  Surg Hx  Fam Hx          Reviewed and updated as needed this visit by Provider   Tobacco  Allergies  Meds  Problems  Med Hx  Surg Hx  Fam Hx         History reviewed. No pertinent past medical history.   History reviewed. No pertinent surgical history.    Review of Systems   Gastrointestinal:  Positive for abdominal pain and heartburn.   Breasts:  Negative for tenderness, breast mass and discharge.   Genitourinary:  Negative for pelvic pain, vaginal bleeding and vaginal discharge.          OBJECTIVE:   /70 (BP Location: Left arm, Patient Position: Sitting, Cuff  "Size: Adult Regular)   Pulse 86   Temp 98.4  F (36.9  C) (Temporal)   Resp 12   Ht 1.562 m (5' 1.5\")   Wt 57.5 kg (126 lb 12.8 oz)   LMP 09/20/2023 (Exact Date)   SpO2 99%   BMI 23.57 kg/m    Physical Exam  GENERAL: healthy, alert and no distress  EYES: Eyes grossly normal to inspection, PERRL and conjunctivae and sclerae normal  HENT: ear canals and TM's normal, nose and mouth without ulcers or lesions  NECK: no adenopathy, no asymmetry, masses, or scars and thyroid normal to palpation  RESP: lungs clear to auscultation - no rales, rhonchi or wheezes  BREAST: normal without masses, tenderness or nipple discharge and no palpable axillary masses or adenopathy  CV: regular rate and rhythm, normal S1 S2, no S3 or S4, no murmur, click or rub, no peripheral edema and peripheral pulses strong  ABDOMEN: soft, nontender, no hepatosplenomegaly, no masses and bowel sounds normal   (female): deferred  MS: no gross musculoskeletal defects noted, no edema  SKIN: no suspicious lesions or rashes  PSYCH: mentation appears normal, affect normal/bright    Diagnostic Test Results:  Labs reviewed in Epic    aMggie Coats MD  [facility]    "

## 2023-09-27 LAB
ALMOND IGE QN: <0.1 KU(A)/L
ANA SER QL IF: NEGATIVE
CASHEW NUT IGE QN: <0.1 KU(A)/L
CODFISH IGE QN: <0.1 KU(A)/L
COW MILK IGE QN: <0.1 KU(A)/L
EGG WHITE IGE QN: <0.1 KU(A)/L
HAZELNUT IGE QN: <0.1 KU(A)/L
IGE SERPL-ACNC: 20 KU/L (ref 0–114)
PEANUT IGE QN: <0.1 KU(A)/L
SALMON IGE QN: <0.1 KU(A)/L
SCALLOP IGE QN: <0.1 KU(A)/L
SESAME SEED IGE QN: <0.1 KU(A)/L
SHRIMP IGE QN: <0.1 KU(A)/L
SOYBEAN IGE QN: <0.1 KU(A)/L
TUNA IGE QN: <0.1 KU(A)/L
WALNUT IGE QN: <0.1 KU(A)/L
WHEAT IGE QN: <0.1 KU(A)/L

## 2023-09-28 LAB
BKR LAB AP GYN ADEQUACY: NORMAL
BKR LAB AP GYN INTERPRETATION: NORMAL
BKR LAB AP HPV REFLEX: NORMAL
BKR LAB AP PREVIOUS ABNORMAL: NORMAL
PATH REPORT.COMMENTS IMP SPEC: NORMAL
PATH REPORT.COMMENTS IMP SPEC: NORMAL
PATH REPORT.RELEVANT HX SPEC: NORMAL

## 2023-09-29 LAB
HUMAN PAPILLOMA VIRUS 16 DNA: NEGATIVE
HUMAN PAPILLOMA VIRUS 18 DNA: NEGATIVE
HUMAN PAPILLOMA VIRUS FINAL DIAGNOSIS: NORMAL
HUMAN PAPILLOMA VIRUS OTHER HR: NEGATIVE

## 2024-01-11 ENCOUNTER — OFFICE VISIT (OUTPATIENT)
Dept: FAMILY MEDICINE | Facility: CLINIC | Age: 39
End: 2024-01-11
Payer: COMMERCIAL

## 2024-01-11 VITALS
WEIGHT: 131 LBS | BODY MASS INDEX: 24.35 KG/M2 | HEART RATE: 107 BPM | DIASTOLIC BLOOD PRESSURE: 62 MMHG | OXYGEN SATURATION: 100 % | SYSTOLIC BLOOD PRESSURE: 114 MMHG | TEMPERATURE: 97.3 F

## 2024-01-11 DIAGNOSIS — R22.0 JAW SWELLING: ICD-10-CM

## 2024-01-11 DIAGNOSIS — M26.609 TMJ (TEMPOROMANDIBULAR JOINT SYNDROME): ICD-10-CM

## 2024-01-11 PROCEDURE — 99213 OFFICE O/P EST LOW 20 MIN: CPT | Performed by: FAMILY MEDICINE

## 2024-01-11 NOTE — PROGRESS NOTES
Hope was seen today for facial swelling.    Diagnoses and all orders for this visit:    TMJ (temporomandibular joint syndrome)  Comments:  reassure the patient swelling most likely TMJ or infection or inflammation of partotid gland as no other consti symptoms treat with NSAID/ICE    Jaw swelling  Comments:  adv aslo to talk to dentis to see if need mouth gaurd to prevent TMJ         Subjective     Hope Hussein 38 year old  presenting for the following health issues:    Patient presents with:  Facial Swelling           1/11/2024     1:29 PM   Additional Questions   Roomed by Taylor MCKEON   Accompanied by Mother       History of Present Illness       Reason for visit:  Swelling on face    She eats 2-3 servings of fruits and vegetables daily.She consumes 2 sweetened beverage(s) daily.She exercises with enough effort to increase her heart rate 9 or less minutes per day.  She exercises with enough effort to increase her heart rate 3 or less days per week.   She is taking medications regularly.         Concern - left side Jaw swelling   Onset: 2wks ago   Description: swelling of face and neck , travel to [place] during that time but no known URI symptoms or exposure to someone   Intensity: moderate  Progression of Symptoms:  same  Accompanying Signs & Symptoms: none may be itching   Previous history of similar problem: no   Precipitating factors:        Worsened by: not sure  Alleviating factors:        Improved by: ibuprofen   Therapies tried and outcome: one dose of ibuprofen           Patient Active Problem List   Diagnosis    Costochondritis    Physically well but worried      Social History     Social History Narrative    Not on file      Current Outpatient Medications   Medication    omeprazole (PRILOSEC) 20 MG DR capsule     No current facility-administered medications for this visit.            Review of Systems   Constitutional, HEENT, cardiovascular, pulmonary, gi and gu systems are negative, except as  otherwise noted.      Wt Readings from Last 3 Encounters:   01/11/24 59.4 kg (131 lb)   09/26/23 57.5 kg (126 lb 12.8 oz)   11/10/22 59.4 kg (131 lb)      Objective      /62   Pulse 107   Temp 97.3  F (36.3  C) (Temporal)   Wt 59.4 kg (131 lb)   LMP 01/07/2024 (Exact Date)   SpO2 100%   BMI 24.35 kg/m     Physical Exam   GENERAL: healthy, alert and no distress  HENT: ear canals and TM's normal, nose and mouth without ulcers or lesions  TMJ: very tight TMJ not able to open her mouth completely + clicking and mild swelling on the left side of face   NECK: no adenopathy, no asymmetry, masses, or scars and thyroid normal to palpation  RESP: lungs clear to auscultation - no rales, rhonchi or wheezes  CV: regular rate and rhythm, normal S1 S2, no S3 or S4, no murmur, click or rub, no peripheral edema and peripheral pulses strong  ABDOMEN: soft, nontender, no hepatosplenomegaly, no masses and bowel sounds normal  MS: no gross musculoskeletal defects noted, no edema    Office Visit on 09/26/2023   Component Date Value Ref Range Status    Color Urine 09/26/2023 Yellow  Colorless, Straw, Light Yellow, Yellow Final    Appearance Urine 09/26/2023 Clear  Clear Final    Glucose Urine 09/26/2023 Negative  Negative mg/dL Final    Bilirubin Urine 09/26/2023 Negative  Negative Final    Ketones Urine 09/26/2023 Negative  Negative mg/dL Final    Specific Gravity Urine 09/26/2023 1.025  1.005 - 1.030 Final    Blood Urine 09/26/2023 Trace (A)  Negative Final    pH Urine 09/26/2023 5.5  5.0 - 8.0 Final    Protein Albumin Urine 09/26/2023 Negative  Negative mg/dL Final    Urobilinogen Urine 09/26/2023 0.2  0.2, 1.0 E.U./dL Final    Nitrite Urine 09/26/2023 Negative  Negative Final    Leukocyte Esterase Urine 09/26/2023 Negative  Negative Final    Trichomonas 09/26/2023 Absent  Absent Final    Yeast 09/26/2023 Present (A)  Absent Final    Clue Cells 09/26/2023 Absent  Absent Final    WBCs/high power field 09/26/2023 2+ (A)  None  Final    Interpretation 09/26/2023 Negative for Intraepithelial Lesion or Malignancy (NILM)    Final    Comment 09/26/2023    Final                    Value:This result contains rich text formatting which cannot be displayed here.    Specimen Adequacy 09/26/2023 Satisfactory for evaluation, endocervical/transformation zone component present   Final    Clinical Information 09/26/2023    Final                    Value:This result contains rich text formatting which cannot be displayed here.    Reflex Testing 09/26/2023 Yes regardless of result   Final    Previous Abnormal? 09/26/2023    Final                    Value:This result contains rich text formatting which cannot be displayed here.    Performing Labs 09/26/2023    Final                    Value:This result contains rich text formatting which cannot be displayed here.    Hemoglobin A1C 09/26/2023 5.4  0.0 - 5.6 % Final    Normal <5.7%   Prediabetes 5.7-6.4%    Diabetes 6.5% or higher     Note: Adopted from ADA consensus guidelines.    Cholesterol 09/26/2023 219 (H)  <200 mg/dL Final    Triglycerides 09/26/2023 143  <150 mg/dL Final    Direct Measure HDL 09/26/2023 46 (L)  >=50 mg/dL Final    LDL Cholesterol Calculated 09/26/2023 144 (H)  <=100 mg/dL Final    Non HDL Cholesterol 09/26/2023 173 (H)  <130 mg/dL Final    Vitamin D, Total (25-Hydroxy) 09/26/2023 19 (L)  20 - 75 ug/L Final    Immunoglobulin E 09/26/2023 20  0 - 114 kU/L Final    [place], Food IgE 09/26/2023 <0.10  <0.10 KU(A)/L Final    Interpretation: None Detected    Cashew, Food IgE 09/26/2023 <0.10  <0.10 KU(A)/L Final    Interpretation: None Detected    Codfish IgE 09/26/2023 <0.10  <0.10 KU(A)/L Final    Interpretation: None Detected    Egg White IgE 09/26/2023 <0.10  <0.10 KU(A)/L Final    Interpretation: None Detected    Hazelnut IgE 09/26/2023 <0.10  <0.10 KU(A)/L Final    Interpretation: None Detected    Milk, Cow IgE 09/26/2023 <0.10  <0.10 KU(A)/L Final    Interpretation: None Detected     Peanut IgE 09/26/2023 <0.10  <0.10 KU(A)/L Final    Interpretation: None Detected    [place] IgE 09/26/2023 <0.10  <0.10 KU(A)/L Final    Interpretation: None Detected    Scallop IgE 09/26/2023 <0.10  <0.10 KU(A)/L Final    Interpretation: None Detected    Sesame Seed IgE 09/26/2023 <0.10  <0.10 KU(A)/L Final    Interpretation: None Detected    Shrimp IgE 09/26/2023 <0.10  <0.10 KU(A)/L Final    Interpretation: None Detected    Soybean IgE 09/26/2023 <0.10  <0.10 KU(A)/L Final    Interpretation: None Detected    Tuna IgE 09/26/2023 <0.10  <0.10 KU(A)/L Final    Interpretation: None Detected    [place], Food IgE 09/26/2023 <0.10  <0.10 KU(A)/L Final    Interpretation: None Detected    Wheat IgE 09/26/2023 <0.10  <0.10 KU(A)/L Final    Interpretation: None Detected    DARIEN interpretation 09/26/2023 Negative  Negative Final      Negative:              <1:40  Borderline Positive:   1:40 - 1:80  Positive:              >1:80    TSH 09/26/2023 2.75  0.30 - 4.20 uIU/mL Final    RBC Urine 09/26/2023 0-2  0-2 /HPF /HPF Final    WBC Urine 09/26/2023 0-5  0-5 /HPF /HPF Final    Squamous Epithelials Urine 09/26/2023 Few (A)  None Seen /LPF Final    Other HR HPV 09/26/2023 Negative  Negative Final    HPV16 DNA 09/26/2023 Negative  Negative Final    HPV18 DNA 09/26/2023 Negative  Negative Final    FINAL DIAGNOSIS 09/26/2023    Final                    Value:This result contains rich text formatting which cannot be displayed here.       Maggie Coats MD 1/11/2024    [facility].  [phone]

## 2024-03-01 ENCOUNTER — MYC MEDICAL ADVICE (OUTPATIENT)
Dept: FAMILY MEDICINE | Facility: CLINIC | Age: 39
End: 2024-03-01
Payer: COMMERCIAL

## 2024-03-01 DIAGNOSIS — R22.0 JAW SWELLING: Primary | ICD-10-CM

## 2024-03-01 DIAGNOSIS — M26.609 TMJ (TEMPOROMANDIBULAR JOINT SYNDROME): ICD-10-CM

## 2024-03-03 ENCOUNTER — HEALTH MAINTENANCE LETTER (OUTPATIENT)
Age: 39
End: 2024-03-03

## 2024-04-01 ENCOUNTER — TRANSFERRED RECORDS (OUTPATIENT)
Dept: HEALTH INFORMATION MANAGEMENT | Facility: CLINIC | Age: 39
End: 2024-04-01
Payer: COMMERCIAL

## 2024-04-16 ENCOUNTER — TRANSFERRED RECORDS (OUTPATIENT)
Dept: HEALTH INFORMATION MANAGEMENT | Facility: CLINIC | Age: 39
End: 2024-04-16
Payer: COMMERCIAL

## 2024-06-01 ENCOUNTER — TRANSFERRED RECORDS (OUTPATIENT)
Dept: HEALTH INFORMATION MANAGEMENT | Facility: CLINIC | Age: 39
End: 2024-06-01
Payer: COMMERCIAL

## 2024-09-27 ENCOUNTER — OFFICE VISIT (OUTPATIENT)
Dept: FAMILY MEDICINE | Facility: CLINIC | Age: 39
End: 2024-09-27
Attending: FAMILY MEDICINE
Payer: COMMERCIAL

## 2024-09-27 VITALS
HEART RATE: 85 BPM | WEIGHT: 129.6 LBS | BODY MASS INDEX: 23.85 KG/M2 | RESPIRATION RATE: 12 BRPM | TEMPERATURE: 98.2 F | DIASTOLIC BLOOD PRESSURE: 88 MMHG | HEIGHT: 62 IN | SYSTOLIC BLOOD PRESSURE: 128 MMHG | OXYGEN SATURATION: 100 %

## 2024-09-27 DIAGNOSIS — K90.49 FOOD INTOLERANCE: ICD-10-CM

## 2024-09-27 DIAGNOSIS — L72.3 SEBACEOUS CYST: ICD-10-CM

## 2024-09-27 DIAGNOSIS — K21.00 GASTROESOPHAGEAL REFLUX DISEASE WITH ESOPHAGITIS WITHOUT HEMORRHAGE: ICD-10-CM

## 2024-09-27 DIAGNOSIS — Z13.228 SCREENING FOR METABOLIC DISORDER: ICD-10-CM

## 2024-09-27 DIAGNOSIS — D50.9 IRON DEFICIENCY ANEMIA, UNSPECIFIED IRON DEFICIENCY ANEMIA TYPE: ICD-10-CM

## 2024-09-27 DIAGNOSIS — Z12.31 VISIT FOR SCREENING MAMMOGRAM: ICD-10-CM

## 2024-09-27 DIAGNOSIS — K11.5 STONE OF SALIVARY GLAND OR DUCT: ICD-10-CM

## 2024-09-27 DIAGNOSIS — Z00.01 ENCOUNTER FOR ROUTINE ADULT MEDICAL EXAM WITH ABNORMAL FINDINGS: Primary | ICD-10-CM

## 2024-09-27 PROCEDURE — 99213 OFFICE O/P EST LOW 20 MIN: CPT | Mod: 25 | Performed by: FAMILY MEDICINE

## 2024-09-27 PROCEDURE — 99396 PREV VISIT EST AGE 40-64: CPT | Performed by: FAMILY MEDICINE

## 2024-09-27 NOTE — PROGRESS NOTES
Preventive Care Visit  [facility] CADEN Coats MD, Family Medicine  Sep 27, 2024      Assessment & Plan     Hope was seen today for physical.    Diagnoses and all orders for this visit:    Encounter for routine adult medical exam with abnormal findings    Visit for screening mammogram  -     MA Screening Bilateral w/ Neftaly; Future    Iron deficiency anemia, unspecified iron deficiency anemia type  Comments:  will recommend MVI with iron  Orders:  -     Cancel: Hemoglobin; Future    Screening for metabolic disorder  -     Cancel: Hemoglobin A1c; Future  -     Cancel: Lipid Profile; Future    Stone of salivary gland or duct  Comments:  sig pain in salivary gland area with swelling , oil pulling method discussed and adv f/u with ENT in case need surgical treatment    Gastroesophageal reflux disease with esophagitis without hemorrhage  Comments:  pt had food allergy panal done last yr which showed no allergies will rule out H pylori if positive will treat  Orders:  -     Helicobacter pylori Antigen Stool; Future  -     Helicobacter pylori Antigen Stool    Food intolerance  Comments:  adv to keep food diary, try elimination diet one wk at a time   resume prilosec after stool testing  Orders:  -     Cancel: Allergen wheat IgE; Future  -     Cancel: Allergen milk IgE; Future    Sebaceous cyst in between 2 breasts  Comments:  reassured the patient that is a benign finding advised not to touch. starting started doing mammogram breast cancer    Other orders  -     PRIMARY CARE FOLLOW-UP SCHEDULING; Future  -     TDAP 10-64Y (ADACEL,BOOSTRIX)  -     Cancel: COVID-19 12+ (PFIZER); Future  -     PRIMARY CARE FOLLOW-UP SCHEDULING  -     REVIEW OF HEALTH MAINTENANCE PROTOCOL ORDERS         Patient has been advised of split billing requirements and indicates understanding: Yes        Counseling  Appropriate preventive services were addressed with this patient via screening, questionnaire, or discussion as  appropriate for fall prevention, nutrition, physical activity, Tobacco-use cessation, social engagement, weight loss and cognition.  Checklist reviewing preventive services available has been given to the patient.  Reviewed patient's diet, addressing concerns and/or questions.   She is at risk for lack of exercise and has been provided with information to increase physical activity for the benefit of her well-being.   She is at risk for psychosocial distress and has been provided with information to reduce risk.           Cynthia Arnett is a 39 year old, presenting for the following:  Physical (Annual px)        9/27/2024    11:44 AM   Additional Questions   Roomed by Taylor NATHAN MA        Health Care Directive  Patient does not have a Health Care Directive or Living Will: Discussed advance care planning with patient; information given to patient to review.    HPI  Wt Readings from Last 4 Encounters:   09/27/24 58.8 kg (129 lb 9.6 oz)   01/11/24 59.4 kg (131 lb)   09/26/23 57.5 kg (126 lb 12.8 oz)   11/10/22 59.4 kg (131 lb)      Throat discomfort/saliva gland stone/PND: Patient was seen by ENT specialist had a CT scan done for her continued discomfort which showed the saliva gland stone patient was advised to do some pulling method with the lemon juice but unable to do as her teeth are very sensitive.  Also advised to do Prilosec 40 mg daily which did not help her symptoms at all  Not keeping any food diary for her GERD.  No change in diet so far    Patient want me to look at small discolored bump  in between the breasts .  No change in size or tenderness      9/27/2024   General Health   How would you rate your overall physical health? Good   Feel stress (tense, anxious, or unable to sleep) Only a little      (!) STRESS CONCERN      9/27/2024   Nutrition   Three or more servings of calcium each day? Yes   Diet: Regular (no restrictions)    Vegetarian/vegan   How many servings of fruit and vegetables per day?  (!) 0-1   How many sweetened beverages each day? 0-1       Multiple values from one day are sorted in reverse-chronological order         9/27/2024   Exercise   Days per week of moderate/strenous exercise 3 days            9/27/2024   Social Factors   Frequency of gathering with friends or relatives Once a week   Worry food won't last until get money to buy more No    No   Food not last or not have enough money for food? No    No   Do you have housing? (Housing is defined as stable permanent housing and does not include staying ouside in a car, in a tent, in an abandoned building, in an overnight shelter, or couch-surfing.) Yes    Yes   Are you worried about losing your housing? No    No   Lack of transportation? No    No   Unable to get utilities (heat,electricity)? No    No       Multiple values from one day are sorted in reverse-chronological order         9/27/2024   Dental   Dentist two times every year? Yes            9/27/2024   TB Screening   Were you born outside of the [place]? Yes              Today's PHQ-2 Score:       1/11/2024     1:56 PM   PHQ-2 ( 1999 Pf[employer])   Q1: Little interest or pleasure in doing things 0   Q2: Feeling down, depressed or hopeless 0   PHQ-2 Score 0   Q1: Little interest or pleasure in doing things Not at all   Q2: Feeling down, depressed or hopeless Not at all   PHQ-2 Score 0         9/27/2024   Substance Use   Alcohol more than 3/day or more than 7/wk No   Do you use any other substances recreationally? No        Social History     Tobacco Use    Smoking status: Never     Passive exposure: Never    Smokeless tobacco: Never   Vaping Use    Vaping status: Never Used             9/27/2024   Breast Cancer Screening   Family history of breast, colon, or ovarian cancer? No / Unknown         Mammogram Screening - Mammogram every 1-2 years updated in Health Maintenance based on mutual decision making        9/27/2024   STI Screening   New sexual partner(s) since last STI/HIV test? No     "    History of abnormal Pap smear: No - age 30- 64 PAP with HPV every 5 years recommended        Latest Ref Rng & Units 9/26/2023    10:03 AM   PAP / HPV   PAP  Negative for Intraepithelial Lesion or Malignancy (NILM)    HPV 16 DNA Negative Negative    HPV 18 DNA Negative Negative    Other HR HPV Negative Negative      ASCVD Risk   The 10-year ASCVD risk score (Susan MCALLISTER, et al., 2019) is: 1%    Values used to calculate the score:      Age: 39 years      Sex: Female      Is Non- : No      Diabetic: No      Tobacco smoker: No      Systolic Blood Pressure: 128 mmHg      Is BP treated: No      HDL Cholesterol: 46 mg/dL      Total Cholesterol: 219 mg/dL        9/27/2024   Contraception/Family Planning   Questions about contraception or family planning No           Reviewed and updated as needed this visit by Provider                    OB History   No obstetric history on file.         Review of Systems  Constitutional, neuro, ENT, endocrine, pulmonary, cardiac, gastrointestinal, genitourinary, musculoskeletal, integument and psychiatric systems are negative, except as otherwise noted.     Objective    Exam  /88   Pulse 85   Temp 98.2  F (36.8  C)   Resp 12   Ht 1.562 m (5' 1.5\")   Wt 58.8 kg (129 lb 9.6 oz)   LMP 09/19/2024 (Exact Date)   SpO2 100%   BMI 24.09 kg/m     Estimated body mass index is 24.09 kg/m  as calculated from the following:    Height as of this encounter: 1.562 m (5' 1.5\").    Weight as of this encounter: 58.8 kg (129 lb 9.6 oz).    Physical Exam  GENERAL: alert and no distress  NECK: no adenopathy, no asymmetry, masses, or scars  RESP: lungs clear to auscultation - no rales, rhonchi or wheezes  BREAST: normal without masses, tenderness or nipple discharge and no palpable axillary masses or adenopathy  CV: regular rate and rhythm, normal S1 S2, no S3 or S4, no murmur, click or rub, no peripheral edema  ABDOMEN: soft, nontender, no hepatosplenomegaly, no " masses and bowel sounds normal  MS: no gross musculoskeletal defects noted, no edema        Signed Electronically by: Maggie Coats MD

## 2024-10-01 PROCEDURE — 87338 HPYLORI STOOL AG IA: CPT | Performed by: FAMILY MEDICINE

## 2024-10-02 LAB — H PYLORI AG STL QL IA: NEGATIVE

## 2024-10-02 RX ORDER — PANTOPRAZOLE SODIUM 40 MG/1
40 TABLET, DELAYED RELEASE ORAL DAILY
Qty: 30 TABLET | Refills: 1 | Status: SHIPPED | OUTPATIENT
Start: 2024-10-02

## 2024-10-28 ENCOUNTER — TRANSFERRED RECORDS (OUTPATIENT)
Dept: HEALTH INFORMATION MANAGEMENT | Facility: CLINIC | Age: 39
End: 2024-10-28
Payer: COMMERCIAL

## 2024-10-29 ENCOUNTER — ANCILLARY PROCEDURE (OUTPATIENT)
Dept: GENERAL RADIOLOGY | Facility: CLINIC | Age: 39
End: 2024-10-29
Attending: FAMILY MEDICINE
Payer: COMMERCIAL

## 2024-10-29 ENCOUNTER — OFFICE VISIT (OUTPATIENT)
Dept: FAMILY MEDICINE | Facility: CLINIC | Age: 39
End: 2024-10-29
Payer: COMMERCIAL

## 2024-10-29 VITALS
WEIGHT: 130.5 LBS | BODY MASS INDEX: 24.01 KG/M2 | OXYGEN SATURATION: 100 % | RESPIRATION RATE: 12 BRPM | DIASTOLIC BLOOD PRESSURE: 84 MMHG | HEART RATE: 92 BPM | TEMPERATURE: 98.2 F | SYSTOLIC BLOOD PRESSURE: 122 MMHG | HEIGHT: 62 IN

## 2024-10-29 DIAGNOSIS — G50.0 FACIAL PAIN SYNDROME: ICD-10-CM

## 2024-10-29 DIAGNOSIS — M26.609 TMJ (TEMPOROMANDIBULAR JOINT SYNDROME): Primary | ICD-10-CM

## 2024-10-29 DIAGNOSIS — M26.609 TMJ (TEMPOROMANDIBULAR JOINT SYNDROME): ICD-10-CM

## 2024-10-29 PROCEDURE — 70328 X-RAY EXAM OF JAW JOINT: CPT | Mod: TC | Performed by: RADIOLOGY

## 2024-10-29 PROCEDURE — 99214 OFFICE O/P EST MOD 30 MIN: CPT | Performed by: FAMILY MEDICINE

## 2024-10-29 PROCEDURE — G2211 COMPLEX E/M VISIT ADD ON: HCPCS | Performed by: FAMILY MEDICINE

## 2024-10-29 RX ORDER — PREDNISONE 20 MG/1
20 TABLET ORAL 2 TIMES DAILY
Qty: 10 TABLET | Refills: 0 | Status: SHIPPED | OUTPATIENT
Start: 2024-10-29 | End: 2024-11-03

## 2024-10-29 RX ORDER — NAPROXEN 500 MG/1
500 TABLET ORAL 2 TIMES DAILY WITH MEALS
Qty: 60 TABLET | Refills: 1 | Status: SHIPPED | OUTPATIENT
Start: 2024-10-29

## 2024-10-29 NOTE — PROGRESS NOTES
Hope was seen today for musculoskeletal problem and throat problem.    Diagnoses and all orders for this visit:    TMJ (temporomandibular joint syndrome)  -     XR TMJ Open/Closed Left; Future  -     Adult Neurosurgery  Referral; Future  -     naproxen (NAPROSYN) 500 MG tablet; Take 1 tablet (500 mg) by mouth 2 times daily (with meals).  -     predniSONE (DELTASONE) 20 MG tablet; Take 1 tablet (20 mg) by mouth 2 times daily for 5 days.    Facial pain syndrome  -     XR TMJ Open/Closed Left; Future  -     Adult Neurosurgery  Referral; Future  -     naproxen (NAPROSYN) 500 MG tablet; Take 1 tablet (500 mg) by mouth 2 times daily (with meals).  -     predniSONE (DELTASONE) 20 MG tablet; Take 1 tablet (20 mg) by mouth 2 times daily for 5 days.    Other orders  -     COVID-19 12+ (PFIZER); Future  -     TDAP 10-64Y (ADACEL,BOOSTRIX); Future       Left jaw/facial pain most likely etiology is TMJ or trigeminal neuralgia.  We will have her see a facial pain specialist meanwhile do the course of steroid Dosepak as well as taking anti-inflammatory for next 3 to 4 days continue Protonix as it might upset the stomach.  Recommend ice pack alternate with heat pack as needed  Virtual visit in couple weeks to follow-up  X-ray of of the TMJ will follow-up on the result      The longitudinal plan of care for the diagnosis(es)/condition(s) as documented were addressed during this visit. Due to the added complexity in care, I will continue to support Hope in the subsequent management and with ongoing continuity of care.  Subjective     Hope Alexandreruby 39 year old  presenting for the following health issues:    Patient presents with:  Musculoskeletal Problem: Facial pain for months, radiates from chin up left cheek. Has seen ENT for this, was told to consult primary care and neurology. Small stone in salvilary gland that was ruled out for causing her pain. Was given abx and pain subsided, came back 3 weeks  "ago more severe.   Throat Problem: Throat discomfort, taking Pantoprazole 40 for over 2 months now. ENT states if 40 mg is not helping to reduce back to 20. Told consult GI.            10/29/2024     9:39 AM   Additional Questions   Roomed by Taylor NATHAN MA       History of Present Illness       Reason for visit:  Face pain right cheek          Pain History:  When did you first notice your pain? 3 weeks   Have you seen anyone else for your pain? Yes - ENT had appointment on October 25 they ruled out saliva gland stone as a problem for pain or parotid gland infection  Seen dentist in January at that time patient did not had pain or evaluation for TMJ  How has your pain affected your ability to work? Affects her at home, does not work  Where in your body do you have pain?  Left cheeck      Patient Active Problem List   Diagnosis    Costochondritis    Physically well but worried    Sebaceous cyst in between 2 breasts    Facial pain syndrome    TMJ (temporomandibular joint syndrome)      Social History     Social History Narrative    Not on file      Current Outpatient Medications   Medication Sig Dispense Refill    naproxen (NAPROSYN) 500 MG tablet Take 1 tablet (500 mg) by mouth 2 times daily (with meals). 60 tablet 1    pantoprazole (PROTONIX) 40 MG EC tablet Take 1 tablet (40 mg) by mouth daily. 30 tablet 1    predniSONE (DELTASONE) 20 MG tablet Take 1 tablet (20 mg) by mouth 2 times daily for 5 days. 10 tablet 0     No current facility-administered medications for this visit.            Review of Systems   Constitutional, HEENT, cardiovascular, pulmonary, GI, , skin, endocrine and psych systems are negative, except as otherwise noted.      Wt Readings from Last 3 Encounters:   10/29/24 59.2 kg (130 lb 8 oz)   09/27/24 58.8 kg (129 lb 9.6 oz)   01/11/24 59.4 kg (131 lb)      Objective      /84   Pulse 92   Temp 98.2  F (36.8  C) (Temporal)   Resp 12   Ht 1.562 m (5' 1.5\")   Wt 59.2 kg (130 lb 8 oz)   LMP " 10/13/2024 (Exact Date)   SpO2 100%   BMI 24.26 kg/m     Physical Exam   GENERAL: alert and no distress  EYES: Eyes grossly normal to inspection, PERRL and conjunctivae and sclerae normal  HENT: ear canals and TM's normal, nose and mouth without ulcers or lesions  NECK: no adenopathy, no asymmetry, masses, or scars  RESP: lungs clear to auscultation - no rales, rhonchi or wheezes  CV: regular rate and rhythm, normal S1 S2, no S3 or S4, no murmur, click or rub, no peripheral edema  MS: limited opening of the mouth , pain at left TMJ no sensory loss    Office Visit on 09/27/2024   Component Date Value Ref Range Status    Helicobacter pylori Antigen Stool 10/01/2024 Negative  Negative Final    Negative for Helicobacter pylori antigen by enzyme immunoassay. A negative result indicates the absence of H. pylori antigen or that the level of antigen is below the level of detection.       Maggie Coats MD 10/29/2024    [facility].  [phone]

## 2024-11-01 NOTE — TELEPHONE ENCOUNTER
RECORDS RECEIVED FROM: Internal    REASON FOR VISIT: TMJ (temporomandibular joint syndrome) [M26.609]  Facial pain syndrome [G50.0]    PROVIDER: Katja De Dios APRN CNP   DATE OF APPT: 11/8/24 @ 8:00 am    NOTES (FOR ALL VISITS) STATUS DETAILS   OFFICE NOTE from referring provider Internal 10/29/24, 1/11/24 Maggie Coats MD @[facility]     OFFICE NOTE from other specialist Received 10/28/24, 5/30/24, 4/16/24 (Transferred Recs) Dayanara Garcia MD @[facility]     MEDICATION LIST Internal    IMAGING  (FOR ALL VISITS)     X-RAY Internal [facility]  10/29/24 XR TMJ Open/Close Left

## 2024-11-05 ENCOUNTER — TRANSFERRED RECORDS (OUTPATIENT)
Dept: HEALTH INFORMATION MANAGEMENT | Facility: CLINIC | Age: 39
End: 2024-11-05
Payer: COMMERCIAL

## 2024-11-08 ENCOUNTER — PRE VISIT (OUTPATIENT)
Dept: NEUROSURGERY | Facility: CLINIC | Age: 39
End: 2024-11-08

## 2024-11-08 ENCOUNTER — OFFICE VISIT (OUTPATIENT)
Dept: NEUROSURGERY | Facility: CLINIC | Age: 39
End: 2024-11-08
Attending: FAMILY MEDICINE
Payer: COMMERCIAL

## 2024-11-08 VITALS — DIASTOLIC BLOOD PRESSURE: 91 MMHG | OXYGEN SATURATION: 100 % | HEART RATE: 94 BPM | SYSTOLIC BLOOD PRESSURE: 133 MMHG

## 2024-11-08 DIAGNOSIS — G50.0 FACIAL PAIN SYNDROME: Primary | ICD-10-CM

## 2024-11-08 DIAGNOSIS — M26.609 TMJ (TEMPOROMANDIBULAR JOINT SYNDROME): ICD-10-CM

## 2024-11-08 PROCEDURE — 99204 OFFICE O/P NEW MOD 45 MIN: CPT | Performed by: NURSE PRACTITIONER

## 2024-11-08 RX ORDER — GABAPENTIN 100 MG/1
CAPSULE ORAL
Qty: 252 CAPSULE | Refills: 0 | Status: SHIPPED | OUTPATIENT
Start: 2024-11-08 | End: 2024-12-13

## 2024-11-08 NOTE — PROGRESS NOTES
[facility]  Department of Neurosurgery      Name: Hope Hussein  MRN: [id number]  Age: 39 year old  : 1985  Referring provider: Maggie Coats  2024      Chief Complaint:   Bilateral Facial pain, atypical, Left > Right  New patient    History of Present Illness:   Hope Hussein is a 39 year old female with a history of GERD, Bilateral hearing loss who is here today for atypical, bilateral facial pain, left > Right. Today, patient presents for the evaluation with her  and 2 kids.     Initial onset of left sided facial pain in 2023. This was acute in nature. She also had some swelling in her cheek on the left side. She was initially seen by a dentist followed by an ENT provider at [facility]. Was placed on Amoxicillin without any improvement in her symptoms. Culture of parotid gland was negative. She had a CT scan of the left parotid gland in 2024 which shoed left parotid swelling and a calcification in the parotid gland. Per ENT notes, the pain is likely not from sialadenitis. She was seen by her dentist who ruled out TMJ.     Today, patient reports a sharp, shooting pain in the left side of her face from forehead, down to her left chin. This comes in episodes which last for 30 minutes or so. After her initial symptom onset in , she had a few months of complete pain relief. Then pain became significantly worse within the past few months. This time, she did not have major swelling in her cheek. Touching the left TMJ area and chewing can make her symptoms worse. She lately has been experiencing similar symptoms in right side of her face as well. She denies light or sound sensitivity. No nausea. No known diagnosis of migraine.     Denies rashes on her face since symptom onset. She denies fever, chills, weight loss. No personal or family h/o Multiple Sclerosis. No h/o facial trauma or surgeries.     Patient denies bruxism, but she tends to clench her teeth. She reports  occasional clicking in left TMJ area.     8 years ago, she was diagnosed with bilateral hearing loss while she was pregnant with her twins and has been using bilateral hearing aids.     She was previously treated with Prednisone. Currently uses Naproxen twice daily  which has been somewhat effective in controlling her facial pain.        Review of Systems:   Pertinent items are noted in HPI or as in patient entered ROS below, remainder of complete ROS is negative.        No data to display                 Active Medications:     Current Outpatient Medications:     gabapentin (NEURONTIN) 100 MG capsule, Take 1 capsule (100 mg) by mouth 3 times daily for 7 days, THEN 2 capsules (200 mg) 3 times daily for 7 days, THEN 3 capsules (300 mg) 3 times daily for 21 days., Disp: 252 capsule, Rfl: 0    naproxen (NAPROSYN) 500 MG tablet, Take 1 tablet (500 mg) by mouth 2 times daily (with meals)., Disp: 60 tablet, Rfl: 1    pantoprazole (PROTONIX) 40 MG EC tablet, Take 1 tablet (40 mg) by mouth daily., Disp: 30 tablet, Rfl: 1      Allergies:   Patient has no known allergies.      Past Medical History:  No past medical history on file.  Patient Active Problem List   Diagnosis    Costochondritis    Physically well but worried    Sebaceous cyst in between 2 breasts    Facial pain syndrome    TMJ (temporomandibular joint syndrome)        Past Surgical History:  No past surgical history on file.    Family History:   No family history on file.      Social History:   Social History     Tobacco Use    Smoking status: Never     Passive exposure: Never    Smokeless tobacco: Never   Vaping Use    Vaping status: Never Used        Physical Exam:   BP (!) 133/91 (BP Location: Right arm, Patient Position: Sitting, Cuff Size: Adult Regular)   Pulse 94   LMP 10/13/2024 (Exact Date)   SpO2 100%    General: No acute distress.   Neuro: The patient is fully oriented. Speech is normal.   Psych: Normal mood and affect. Behavior is normal.       Imaging:  10/29/2024 XR TMJ:  IMPRESSION: Open- and closed-mouth lateral views left TMJ along with frontal view both TMJs. Satisfactory mineralization with no fracture. Open- and closed-mouth views show no acute process or fracture. Due to positioning, it is difficult to assess for   possible changes/degree of excursion on open- and closed-mouth views. If there is concern for internal derangement of the disc, consider a dedicated MRI of the TMJs. No acute process overall is identified.     Assessment:  Facial pain, atypical  New patient    Plan:  Diagnosis is unclear at this time. Some features of her facial pain is consistent with TN, but swelling is not common with this. We discussed a trial of Gabapentin 100 mg three times a day and gradually titrate it up to 300 mg three times a day. If her pain is from a neurological cause, this medication may be helpful for her symptoms. We will also complete a brain and TMJ MRI for further evaluation. I'll plan to see her for a follow-up in 4 weeks.     She reports some symptoms consistent with TMJ. So I gave her referral options to 3 different TMJ clinics around the [facility].         Katja De Dios CNP  Department of Neurosurgery    I spent 45 minutes on patient care activities related to this encounter on the date of service, including time spent reviewing the chart, obtaining history and examination and in counseling the patient, and in documentation in the electronic medical record.

## 2024-11-08 NOTE — LETTER
2024       RE: Hope Hussein  [address] [facility] [address]     Dear Colleague,    Thank you for referring your patient, Hope Hussein, to the [facility] NEUROSURGERY CLINIC [place] at [facility]. Please see a copy of my visit note below.    [facility]  Department of Neurosurgery      Name: Hope Hussein  MRN: [id number]  Age: 39 year old  : 1985  Referring provider: Maggie Coats  2024      Chief Complaint:   Bilateral Facial pain, atypical, Left > Right  New patient    History of Present Illness:   Hope Hussein is a 39 year old female with a history of GERD, Bilateral hearing loss who is here today for atypical, bilateral facial pain, left > Right. Today, patient presents for the evaluation with her  and 2 kids.     Initial onset of left sided facial pain in 2023. This was acute in nature. She also had some swelling in her cheek on the left side. She was initially seen by a dentist followed by an ENT provider at [facility]. Was placed on Amoxicillin without any improvement in her symptoms. Culture of parotid gland was negative. She had a CT scan of the left parotid gland in 2024 which shoed left parotid swelling and a calcification in the parotid gland. Per ENT notes, the pain is likely not from sialadenitis. She was seen by her dentist who ruled out TMJ.     Today, patient reports a sharp, shooting pain in the left side of her face from forehead, down to her left chin. This comes in episodes which last for 30 minutes or so. After her initial symptom onset in , she had a few months of complete pain relief. Then pain became significantly worse within the past few months. This time, she did not have major swelling in her cheek. Touching the left TMJ area and chewing can make her symptoms worse. She lately has been experiencing similar symptoms in right side of her face as well. She denies light  or sound sensitivity. No nausea. No known diagnosis of migraine.     Denies rashes on her face since symptom onset. She denies fever, chills, weight loss. No personal or family h/o Multiple Sclerosis. No h/o facial trauma or surgeries.     Patient denies bruxism, but she tends to clench her teeth. She reports occasional clicking in left TMJ area.     8 years ago, she was diagnosed with bilateral hearing loss while she was pregnant with her twins and has been using bilateral hearing aids.     She was previously treated with Prednisone. Currently uses Naproxen twice daily  which has been somewhat effective in controlling her facial pain.        Review of Systems:   Pertinent items are noted in HPI or as in patient entered ROS below, remainder of complete ROS is negative.        No data to display                 Active Medications:     Current Outpatient Medications:      gabapentin (NEURONTIN) 100 MG capsule, Take 1 capsule (100 mg) by mouth 3 times daily for 7 days, THEN 2 capsules (200 mg) 3 times daily for 7 days, THEN 3 capsules (300 mg) 3 times daily for 21 days., Disp: 252 capsule, Rfl: 0     naproxen (NAPROSYN) 500 MG tablet, Take 1 tablet (500 mg) by mouth 2 times daily (with meals)., Disp: 60 tablet, Rfl: 1     pantoprazole (PROTONIX) 40 MG EC tablet, Take 1 tablet (40 mg) by mouth daily., Disp: 30 tablet, Rfl: 1      Allergies:   Patient has no known allergies.      Past Medical History:  No past medical history on file.  Patient Active Problem List   Diagnosis     Costochondritis     Physically well but worried     Sebaceous cyst in between 2 breasts     Facial pain syndrome     TMJ (temporomandibular joint syndrome)        Past Surgical History:  No past surgical history on file.    Family History:   No family history on file.      Social History:   Social History     Tobacco Use     Smoking status: Never     Passive exposure: Never     Smokeless tobacco: Never   Vaping Use     Vaping status: Never Used         Physical Exam:   BP (!) 133/91 (BP Location: Right arm, Patient Position: Sitting, Cuff Size: Adult Regular)   Pulse 94   LMP 10/13/2024 (Exact Date)   SpO2 100%    General: No acute distress.   Neuro: The patient is fully oriented. Speech is normal.   Psych: Normal mood and affect. Behavior is normal.      Imaging:  10/29/2024 XR TMJ:  IMPRESSION: Open- and closed-mouth lateral views left TMJ along with frontal view both TMJs. Satisfactory mineralization with no fracture. Open- and closed-mouth views show no acute process or fracture. Due to positioning, it is difficult to assess for   possible changes/degree of excursion on open- and closed-mouth views. If there is concern for internal derangement of the disc, consider a dedicated MRI of the TMJs. No acute process overall is identified.     Assessment:  Facial pain, atypical  New patient    Plan:  Diagnosis is unclear at this time. Some features of her facial pain is consistent with TN, but swelling is not common with this. We discussed a trial of Gabapentin 100 mg three times a day and gradually titrate it up to 300 mg three times a day. If her pain is from a neurological cause, this medication may be helpful for her symptoms. We will also complete a brain and TMJ MRI for further evaluation. I'll plan to see her for a follow-up in 4 weeks.     She reports some symptoms consistent with TMJ. So I gave her referral options to 3 different TMJ clinics around the [facility].         Katja De Dios CNP  Department of Neurosurgery    I spent 45 minutes on patient care activities related to this encounter on the date of service, including time spent reviewing the chart, obtaining history and examination and in counseling the patient, and in documentation in the electronic medical record.      Again, thank you for allowing me to participate in the care of your patient.      Sincerely,    IONA Aragon CNP

## 2024-11-08 NOTE — PATIENT INSTRUCTIONS
Start gabapentin as follows:  Week 1: 100 mg three times a day.   Week 2: 200 mg three times a day  Week 3 onwards: 300 mg three times a day and continue this dose.     2. MRI Brain as ordered.     3. Follow-up with me in 4 weeks.     4. Contact any of the following clinics for an appointment for TMJ evaluation:  Brenda Vasquez  [facility] [place]  Appointments: [phone]    2. Eugene Cordova at [facility]  [email or web address]  Appointments: [phone]    3. [facility]  [email or web address]

## 2025-01-06 ENCOUNTER — MYC MEDICAL ADVICE (OUTPATIENT)
Dept: NEUROSURGERY | Facility: CLINIC | Age: 40
End: 2025-01-06
Payer: COMMERCIAL

## 2025-01-07 ENCOUNTER — TELEPHONE (OUTPATIENT)
Dept: NEUROSURGERY | Facility: CLINIC | Age: 40
End: 2025-01-07
Payer: COMMERCIAL

## 2025-01-07 NOTE — TELEPHONE ENCOUNTER
RECORDS RECEIVED FROM: Internal    REASON FOR VISIT: Facial pain, surgical consult   PROVIDER: Morro Martin MD   DATE OF APPT: 1/8/25 @ 4:00 pm    NOTES (FOR ALL VISITS) STATUS DETAILS   OFFICE NOTE from referring provider Internal 12/6/24, 11/8/24 Katja De Dios APRN CNP @[facility]-NeuroSurg     OFFICE NOTE from other specialist Received 10/29/24, 1/11/24 Maggie Coats MD @[facility]     10/28/24, 5/30/24, 4/16/24 (Transferred Recs) Dayanara Garcia MD @[facility]      MEDICATION LIST Internal    IMAGING  (FOR ALL VISITS)     X-RAY Internal [facility]  10/29/24 XR TMJ Open/Close Left     MRI (HEAD, NECK, SPINE) Internal [facility]  11/27/24 MR Brain

## 2025-01-07 NOTE — TELEPHONE ENCOUNTER
Patient Contacted for the patient to call back and schedule the following:    Appointment type: New Facial MD  Provider: Dr Martin  Return date: First available  Specialty phone number: [phone]  Additional appointment(s) needed: na  Additonal Notes: Spoke to pt and -Sched Dr. Martin for a virtual visit to discuss surgical treatment.

## 2025-01-08 ENCOUNTER — TELEPHONE (OUTPATIENT)
Dept: NEUROSURGERY | Facility: CLINIC | Age: 40
End: 2025-01-08

## 2025-01-08 ENCOUNTER — PRE VISIT (OUTPATIENT)
Dept: NEUROSURGERY | Facility: CLINIC | Age: 40
End: 2025-01-08

## 2025-01-08 ENCOUNTER — VIRTUAL VISIT (OUTPATIENT)
Dept: NEUROSURGERY | Facility: CLINIC | Age: 40
End: 2025-01-08
Payer: COMMERCIAL

## 2025-01-08 VITALS — HEIGHT: 61 IN | BODY MASS INDEX: 24.55 KG/M2 | WEIGHT: 130 LBS

## 2025-01-08 DIAGNOSIS — G50.0 FACIAL PAIN SYNDROME: Primary | ICD-10-CM

## 2025-01-08 PROCEDURE — 98004 SYNCH AUDIO-VIDEO EST SF 10: CPT | Performed by: NEUROLOGICAL SURGERY

## 2025-01-08 ASSESSMENT — PAIN SCALES - GENERAL: PAINLEVEL_OUTOF10: NO PAIN (0)

## 2025-01-08 NOTE — NURSING NOTE
Current patient location: [address] [place] [id number]    Is the patient currently in the state of [place]? YES    Visit mode: VIDEO    If the visit is dropped, the patient can be reconnected by:VIDEO VISIT: Text to cell phone:   Telephone Information:   Mobile [phone]   Mobile [phone]       Will anyone else be joining the visit? NO  (If patient encounters technical issues they should call [phone]    Are changes needed to the allergy or medication list? No    Are refills needed on medications prescribed by this physician? NO    Rooming Documentation:  Questionnaire(s) not pre-assigned    Reason for visit: Consult    Ana ALVAREZ

## 2025-01-08 NOTE — LETTER
1/8/2025       RE: Hope Hussein  [address] [facility] [address]     Dear Colleague,    Thank you for referring your patient, Hope Hussein, to the [facility] NEUROSURGERY CLINIC [facility]. Please see a copy of my visit note below.    Virtual Visit Details    Type of service:  Video Visit     I had the pleasure to talk to Hope today.  This was during a neurosurgery video visit.  She gave consent for the visit.    Briefly she is a 39-year-old female that has a history of left-sided greater than right-sided facial pain.  The pain is primarily on the left side and involves her whole face and also behind her ear.  The pain comes on fairly quick and is relatively sharp and then stays for 15 to 30 minutes.  It will occur in random areas on the face.  She does not have any significant triggers such as eating, drinking, talking, touching her face or wind.    She has been trialed on gabapentin and this seems to be controlling her pain currently.    Today she is presenting for a better understanding of the diagnosis.  Based on my conversation with her I do not think that this is trigeminal neuralgia.  I would like her to be seen in our multidisciplinary facial pain clinic to get a better sense of the diagnosis.  At that point we can determine whether or not gabapentin is the best treatment or not for her.  She is agreeable to this plan.      Again, thank you for allowing me to participate in the care of your patient.      Sincerely,    Morro Martin MD

## 2025-01-08 NOTE — PROGRESS NOTES
Virtual Visit Details    Type of service:  Video Visit     I had the pleasure to talk to Hope today.  This was during a neurosurgery video visit.  She gave consent for the visit.    Briefly she is a 39-year-old female that has a history of left-sided greater than right-sided facial pain.  The pain is primarily on the left side and involves her whole face and also behind her ear.  The pain comes on fairly quick and is relatively sharp and then stays for 15 to 30 minutes.  It will occur in random areas on the face.  She does not have any significant triggers such as eating, drinking, talking, touching her face or wind.    She has been trialed on gabapentin and this seems to be controlling her pain currently.    Today she is presenting for a better understanding of the diagnosis.  Based on my conversation with her I do not think that this is trigeminal neuralgia.  I would like her to be seen in our multidisciplinary facial pain clinic to get a better sense of the diagnosis.  At that point we can determine whether or not gabapentin is the best treatment or not for her.  She is agreeable to this plan.

## 2025-03-07 DIAGNOSIS — K90.49 FOOD INTOLERANCE: ICD-10-CM

## 2025-03-07 DIAGNOSIS — K21.00 GASTROESOPHAGEAL REFLUX DISEASE WITH ESOPHAGITIS WITHOUT HEMORRHAGE: ICD-10-CM

## 2025-03-07 NOTE — TELEPHONE ENCOUNTER
Clinic RN: Please investigate patient's chart or contact patient if the information cannot be found because patient should have run out of this medication on 12/2024 pantoprazole . Confirm patient is taking this medication as prescribed. Document findings and route refill encounter to provider for approval or denial.

## 2025-03-10 RX ORDER — PANTOPRAZOLE SODIUM 40 MG/1
40 TABLET, DELAYED RELEASE ORAL DAILY
Qty: 30 TABLET | Refills: 1 | Status: SHIPPED | OUTPATIENT
Start: 2025-03-10